# Patient Record
Sex: MALE | Race: WHITE | Employment: OTHER | URBAN - METROPOLITAN AREA
[De-identification: names, ages, dates, MRNs, and addresses within clinical notes are randomized per-mention and may not be internally consistent; named-entity substitution may affect disease eponyms.]

---

## 2017-02-10 ENCOUNTER — OFFICE VISIT (OUTPATIENT)
Dept: OPTOMETRY | Facility: CLINIC | Age: 67
End: 2017-02-10

## 2017-02-10 DIAGNOSIS — H52.03 HYPEROPIA WITH ASTIGMATISM AND PRESBYOPIA, BILATERAL: ICD-10-CM

## 2017-02-10 DIAGNOSIS — H52.4 HYPEROPIA WITH ASTIGMATISM AND PRESBYOPIA, BILATERAL: ICD-10-CM

## 2017-02-10 DIAGNOSIS — H52.203 HYPEROPIA WITH ASTIGMATISM AND PRESBYOPIA, BILATERAL: ICD-10-CM

## 2017-02-10 DIAGNOSIS — E11.9 DIABETES MELLITUS, STABLE (HCC): Primary | ICD-10-CM

## 2017-02-10 DIAGNOSIS — H25.13 AGE-RELATED NUCLEAR CATARACT OF BOTH EYES: ICD-10-CM

## 2017-02-10 PROCEDURE — 92014 COMPRE OPH EXAM EST PT 1/>: CPT | Performed by: OPTOMETRIST

## 2017-02-10 NOTE — PATIENT INSTRUCTIONS
Hyperopia with astigmatism and presbyopia  Patient is happy using his +3.25 OTC readers.     Diabetes mellitus, stable ([facility] [address]  I advised patient that there is no background diabetic retinopathy in either eye and that they should continue to keep their blood

## 2017-02-10 NOTE — PROGRESS NOTES
Leticia Vera is a 73year old male. HPI:     HPI     Diabetic Eye Exam   Diabetes characteristics include Type 2, controlled with diet and taking oral medications. Duration of 14 years.            Comments   Patient is in for an annual diabetic eye ex MG Oral Tab Take 5 mg by mouth 3 (three) times daily as needed for Muscle spasms.  Disp:  Rfl:    Glucose Blood (ONETOUCH ULTRA BLUE) In Vitro Strip Test by Intradermal route  every day Disp:  Rfl:    NAPROXEN  MG Oral Tab EC  Disp:  Rfl: 0       Jerry reading only      Manifest Refraction     Declines --happy with just OTC reading glasses and does not want distance glasses.                  ASSESSMENT/PLAN:     Diagnoses and Plan:     Hyperopia with astigmatism and presbyopia  Patient is happy using his

## 2017-02-10 NOTE — PROGRESS NOTES
Kelli Francis is a 73year old male. HPI:     HPI     Diabetic Eye Exam   Diabetes characteristics include Type 2, controlled with diet and taking oral medications. Duration of 15 years.            Comments   Patient is in for an annual diabetic eye ex MG Oral Tab Take 5 mg by mouth 3 (three) times daily as needed for Muscle spasms.  Disp:  Rfl:    Glucose Blood (ONETOUCH ULTRA BLUE) In Vitro Strip Test by Intradermal route  every day Disp:  Rfl:    NAPROXEN  MG Oral Tab EC  Disp:  Rfl: 0       Jerry reading only      Manifest Refraction     Declines --happy with just OTC reading glasses and does not want distance glasses.                  ASSESSMENT/PLAN:     Diagnoses and Plan:     Hyperopia with astigmatism and presbyopia  Patient is happy using his

## 2017-04-17 ENCOUNTER — APPOINTMENT (OUTPATIENT)
Dept: CT IMAGING | Facility: HOSPITAL | Age: 67
End: 2017-04-17
Attending: EMERGENCY MEDICINE
Payer: COMMERCIAL

## 2017-04-17 ENCOUNTER — HOSPITAL ENCOUNTER (EMERGENCY)
Facility: HOSPITAL | Age: 67
Discharge: HOME | End: 2017-04-17
Payer: COMMERCIAL

## 2017-04-17 ENCOUNTER — APPOINTMENT (OUTPATIENT)
Dept: ULTRASOUND IMAGING | Facility: HOSPITAL | Age: 67
End: 2017-04-17
Payer: COMMERCIAL

## 2017-04-17 VITALS
TEMPERATURE: 98 F | HEART RATE: 79 BPM | SYSTOLIC BLOOD PRESSURE: 130 MMHG | OXYGEN SATURATION: 98 % | HEIGHT: 70 IN | BODY MASS INDEX: 26.39 KG/M2 | WEIGHT: 184.31 LBS | RESPIRATION RATE: 18 BRPM | DIASTOLIC BLOOD PRESSURE: 79 MMHG

## 2017-04-17 DIAGNOSIS — I82.412 ACUTE DEEP VEIN THROMBOSIS (DVT) OF FEMORAL VEIN OF LEFT LOWER EXTREMITY (HCC): Primary | ICD-10-CM

## 2017-04-17 PROCEDURE — 99285 EMERGENCY DEPT VISIT HI MDM: CPT

## 2017-04-17 PROCEDURE — 71260 CT THORAX DX C+: CPT

## 2017-04-17 PROCEDURE — 85610 PROTHROMBIN TIME: CPT | Performed by: EMERGENCY MEDICINE

## 2017-04-17 PROCEDURE — 85025 COMPLETE CBC W/AUTO DIFF WBC: CPT | Performed by: EMERGENCY MEDICINE

## 2017-04-17 PROCEDURE — 80076 HEPATIC FUNCTION PANEL: CPT | Performed by: EMERGENCY MEDICINE

## 2017-04-17 PROCEDURE — 93971 EXTREMITY STUDY: CPT

## 2017-04-17 PROCEDURE — 96361 HYDRATE IV INFUSION ADD-ON: CPT

## 2017-04-17 PROCEDURE — 80048 BASIC METABOLIC PNL TOTAL CA: CPT | Performed by: EMERGENCY MEDICINE

## 2017-04-17 PROCEDURE — 85730 THROMBOPLASTIN TIME PARTIAL: CPT | Performed by: EMERGENCY MEDICINE

## 2017-04-17 PROCEDURE — 96360 HYDRATION IV INFUSION INIT: CPT

## 2017-04-17 RX ORDER — SODIUM CHLORIDE 9 MG/ML
INJECTION, SOLUTION INTRAVENOUS ONCE
Status: COMPLETED | OUTPATIENT
Start: 2017-04-17 | End: 2017-04-17

## 2017-04-18 ENCOUNTER — TELEPHONE (OUTPATIENT)
Dept: NEPHROLOGY | Facility: CLINIC | Age: 67
End: 2017-04-18

## 2017-04-18 NOTE — TELEPHONE ENCOUNTER
Nisha Yost states pt went to ER yesterday and requesting pt to be seen in the next few days by MKK. Nisha Yost is aware MKK will be out of the office - requesting medical advise. PLs call [phone] or call cell at [phone]. Thank you.

## 2017-04-18 NOTE — ED PROVIDER NOTES
Patient Seen in: [facility] AND [facility] Emergency Department    History   Patient presents with:  Deep Vein Thrombosis (cardiovascular)    Stated Complaint: possible DVT left leg    HPI    Patient is a 43-year-old male with a history of diabetes who complains Smoker                      Alcohol Use: Yes                Comment: beer, 1 glass, socially      Review of Systems    Positive for stated complaint: possible DVT left leg  Other systems are as noted in HPI. Constitutional and vital signs reviewed.       A affect. Behavior is normal. Judgment and thought content normal.   Nursing note and vitals reviewed.           ED Course     Labs Reviewed   BASIC METABOLIC PANEL (8) - Abnormal; Notable for the following:     Glucose 263 (*)     All other components within diagnosis)    Disposition:  Discharge    Follow-up:  MD Clarice Wilson [id number]  [phone]    In 2 days        Medications Prescribed:  Current Discharge Medication List    START taking these medications    rivaroxaban

## 2017-04-18 NOTE — TELEPHONE ENCOUNTER
Left message on Guadalupe's cell that Aldo Wilson needs to see internist as a vascular surgeon would not deal with DVT at this time per MKK. Advised to call office to let us know she received message and if she has any questions.

## 2017-04-18 NOTE — TELEPHONE ENCOUNTER
He has a deep venous thrombosis. Again needs to see an internist.  A vascular surgeon will not deal with this.

## 2017-04-18 NOTE — TELEPHONE ENCOUNTER
Spouse Nisha Yost calling to f/u on message.  Stated preferred to take pt to vascular specialist instead of another internist, Spouse states she is trying to get advise from Rn.

## 2017-04-24 ENCOUNTER — OFFICE VISIT (OUTPATIENT)
Dept: INTERNAL MEDICINE CLINIC | Facility: CLINIC | Age: 67
End: 2017-04-24

## 2017-04-24 VITALS
SYSTOLIC BLOOD PRESSURE: 120 MMHG | OXYGEN SATURATION: 96 % | RESPIRATION RATE: 16 BRPM | DIASTOLIC BLOOD PRESSURE: 80 MMHG | WEIGHT: 178 LBS | TEMPERATURE: 99 F | HEART RATE: 93 BPM | BODY MASS INDEX: 26.36 KG/M2 | HEIGHT: 69 IN

## 2017-04-24 DIAGNOSIS — I82.402 LEG DVT (DEEP VENOUS THROMBOEMBOLISM), ACUTE, LEFT (HCC): Primary | ICD-10-CM

## 2017-04-24 DIAGNOSIS — E11.65 POORLY CONTROLLED TYPE 2 DIABETES MELLITUS (HCC): ICD-10-CM

## 2017-04-24 DIAGNOSIS — I51.7 CARDIOMEGALY: ICD-10-CM

## 2017-04-24 DIAGNOSIS — R59.1 LYMPHADENOPATHY: ICD-10-CM

## 2017-04-24 PROCEDURE — 99203 OFFICE O/P NEW LOW 30 MIN: CPT | Performed by: INTERNAL MEDICINE

## 2017-04-24 NOTE — PROGRESS NOTES
Kelli Francis is a 73year old male.   Patient presents with:  ER F/U: New patient presents to clinic for ER follow up. pt was Dx with DVT        HPI:         New patient follows up after seen in ER for extensive DVT of the left leg extending from femora HAND/FINGER SURGERY UNLISTED Right     Comment hand surgery      Family History   Problem Relation Age of Onset   • Diabetes Other      aunt   • Diabetes Sister    • Diabetes Other      uncle   • Glaucoma Neg    • Diabetes Father    • Cancer Mother      co 100  mmol/L   CO2 25 22-32 mmol/L   BUN 9 8-20 mg/dL   Creatinine 0.64 0.50-1.50 mg/dL   Calcium, Total 8.9 8.5-10.5 mg/dL   BUN/CREA Ratio 14.1 10.0-20.0   Anion Gap 11 0-18   Calculated Osmolality 290 275-295 mOsm/kg   GFR, Non-African American >60 visualized greater saphenous vein at the junction appear normal. Extensive thrombus identified at the proximal to distal femoral vein, the popliteal veins and extending  into and involving the paired posterior tibial and peroneal veins with additional invo subsegmental atelectasis in the posterior aspects of the lungs. No focal pulmonary opacity or nodule. No pleural effusion or pneumothorax. VASCULATURE: Main pulmonary artery is not enlarged.  No acute pulmonary embolus to the segmental pulmonary artery le file for this visit. No Follow-up on file.         Ahsan Velarde MD

## 2017-05-04 ENCOUNTER — OFFICE VISIT (OUTPATIENT)
Dept: INTERNAL MEDICINE CLINIC | Facility: CLINIC | Age: 67
End: 2017-05-04

## 2017-05-04 VITALS
HEART RATE: 80 BPM | OXYGEN SATURATION: 97 % | DIASTOLIC BLOOD PRESSURE: 78 MMHG | TEMPERATURE: 99 F | HEIGHT: 69 IN | RESPIRATION RATE: 14 BRPM | BODY MASS INDEX: 27.55 KG/M2 | WEIGHT: 186 LBS | SYSTOLIC BLOOD PRESSURE: 130 MMHG

## 2017-05-04 DIAGNOSIS — E08.65: ICD-10-CM

## 2017-05-04 DIAGNOSIS — E08.59: ICD-10-CM

## 2017-05-04 DIAGNOSIS — I82.402 ACUTE DEEP VEIN THROMBOSIS (DVT) OF LEFT LOWER EXTREMITY, UNSPECIFIED VEIN (HCC): Primary | ICD-10-CM

## 2017-05-04 PROCEDURE — 99213 OFFICE O/P EST LOW 20 MIN: CPT | Performed by: INTERNAL MEDICINE

## 2017-05-04 NOTE — PROGRESS NOTES
Ileana Yoon is a 73year old male. Patient presents with: Follow - Up: pt presents to clinic for follow uo on Left leg DVT       HPI:       No  bleeding problems  Pain left leg if stands on it too long, tho feels better than prior.   Feels well other hernia      Navel Hernia   • Diabetes ([facility]    • Pneumonia 12-25-14   • Diabetes mellitus ([facility]    • Herniated intervertebral disc of lumbar spine           Past Surgical History    UMBILICAL HERNIA REPAIR      HAND/FINGER SURGERY UNLISTED Right     Commen METABOLIC PANEL (8)   Result Value Ref Range   Glucose 263 (H) 70-99 mg/dL   Sodium 136 136-144 mmol/L   Potassium 4.1 3.3-5.1 mmol/L   Chloride 100  mmol/L   CO2 25 22-32 mmol/L   BUN 9 8-20 mg/dL   Creatinine 0.64 0.50-1.50 mg/dL   Calcium, Total 8 left lower extremity was performed in the usual manner. FINDINGS: Venous flow was evaluated with color and pulsed Doppler.  Common femoral and visualized greater saphenous vein at the junction appear normal. Extensive thrombus identified at the proximal to gastroesophageal junction node. No mediastinal or hilar adenopathy. LUNGS/PLEURA: Central airways are patent. There is dependent subsegmental atelectasis in the posterior aspects of the lungs. No focal pulmonary opacity or nodule.   No pleural effusion next visit. Presently, patient advised to continue diabetic medications and check blood sugar twice daily.           Imaging & Consults:  None    Meds & Refills for this Visit:     No prescriptions requested or ordered in this encounter    There are no [place]

## 2017-05-16 RX ORDER — METFORMIN HYDROCHLORIDE 500 MG/1
1000 TABLET, EXTENDED RELEASE ORAL 2 TIMES DAILY WITH MEALS
Qty: 120 TABLET | Refills: 0 | Status: SHIPPED | OUTPATIENT
Start: 2017-05-16 | End: 2017-06-12

## 2017-05-16 RX ORDER — GLIMEPIRIDE 2 MG/1
4 TABLET ORAL 2 TIMES DAILY
Qty: 120 TABLET | Refills: 0 | Status: SHIPPED | OUTPATIENT
Start: 2017-05-16 | End: 2017-07-10

## 2017-06-05 ENCOUNTER — OFFICE VISIT (OUTPATIENT)
Dept: INTERNAL MEDICINE CLINIC | Facility: CLINIC | Age: 67
End: 2017-06-05

## 2017-06-05 VITALS
TEMPERATURE: 98 F | BODY MASS INDEX: 27.99 KG/M2 | WEIGHT: 189 LBS | SYSTOLIC BLOOD PRESSURE: 110 MMHG | RESPIRATION RATE: 17 BRPM | HEIGHT: 69 IN | HEART RATE: 63 BPM | OXYGEN SATURATION: 97 % | DIASTOLIC BLOOD PRESSURE: 60 MMHG

## 2017-06-05 DIAGNOSIS — I82.402 DEEP VEIN THROMBOSIS (DVT) OF LEFT LOWER EXTREMITY, UNSPECIFIED CHRONICITY, UNSPECIFIED VEIN (HCC): Primary | ICD-10-CM

## 2017-06-05 DIAGNOSIS — E88.9 PERIPHERAL NEUROPATHY DUE TO METABOLIC DISORDER (HCC): ICD-10-CM

## 2017-06-05 DIAGNOSIS — G63 PERIPHERAL NEUROPATHY DUE TO METABOLIC DISORDER (HCC): ICD-10-CM

## 2017-06-05 DIAGNOSIS — E11.8 TYPE 2 DIABETES MELLITUS WITH COMPLICATION, WITHOUT LONG-TERM CURRENT USE OF INSULIN (HCC): ICD-10-CM

## 2017-06-05 DIAGNOSIS — E78.1 HYPERTRIGLYCERIDEMIA: ICD-10-CM

## 2017-06-05 PROCEDURE — 99214 OFFICE O/P EST MOD 30 MIN: CPT | Performed by: INTERNAL MEDICINE

## 2017-06-05 NOTE — PROGRESS NOTES
Shelia Aponte is a 73year old male.   Patient presents with:  Diabetes: DM check up// needs everything DM and is due for an AWV  Deep Vein Thrombosis (cardiovascular): f/u on that as well       HPI:         Blood sugar too low at times, once in mid afte colon cancer,  80        Smoking Status: Never Smoker                      Alcohol Use: Yes                Comment: beer, 1 glass, socially         Review of System:  CONSTITUTION: denies fevers,  chills, or sweats  HEENT: denies sore throat,  change i -American >60 >=60   -HEPATIC FUNCTION PANEL (7)   Result Value Ref Range   AST 30 15-41 U/L   ALT 33 17-63 U/L   Alkaline Phosphatase 50  U/L   Bilirubin, Total 1.0 0.3-1.2 mg/dL   Total Protein 7.1 5.9-8.4 g/dL   Albumin 3.4 (L) 3.5-4.8 g/dL Decrease glimeripide to 1 at evening    (E88.9,  G99.0) 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

## 2017-06-12 RX ORDER — METFORMIN HYDROCHLORIDE 500 MG/1
TABLET, EXTENDED RELEASE ORAL
Qty: 120 TABLET | Refills: 1 | Status: SHIPPED | OUTPATIENT
Start: 2017-06-12 | End: 2017-08-23

## 2017-07-02 ENCOUNTER — LAB ENCOUNTER (OUTPATIENT)
Dept: LAB | Facility: HOSPITAL | Age: 67
End: 2017-07-02
Attending: INTERNAL MEDICINE
Payer: COMMERCIAL

## 2017-07-02 DIAGNOSIS — E11.65 UNCONTROLLED TYPE 2 DIABETES MELLITUS WITH COMPLICATION, UNSPECIFIED LONG TERM INSULIN USE STATUS: ICD-10-CM

## 2017-07-02 DIAGNOSIS — E78.1 HYPERTRIGLYCERIDEMIA: ICD-10-CM

## 2017-07-02 DIAGNOSIS — E11.8 UNCONTROLLED TYPE 2 DIABETES MELLITUS WITH COMPLICATION, UNSPECIFIED LONG TERM INSULIN USE STATUS: ICD-10-CM

## 2017-07-02 DIAGNOSIS — E11.8 TYPE 2 DIABETES MELLITUS WITH COMPLICATION, WITHOUT LONG-TERM CURRENT USE OF INSULIN (HCC): ICD-10-CM

## 2017-07-02 DIAGNOSIS — I82.402 DEEP VEIN THROMBOSIS (DVT) OF LEFT LOWER EXTREMITY, UNSPECIFIED CHRONICITY, UNSPECIFIED VEIN (HCC): ICD-10-CM

## 2017-07-02 LAB
ALBUMIN SERPL BCP-MCNC: 3.7 G/DL (ref 3.5–4.8)
ALBUMIN/GLOB SERPL: 1.1 {RATIO} (ref 1–2)
ALP SERPL-CCNC: 40 U/L (ref 32–100)
ALT SERPL-CCNC: 42 U/L (ref 17–63)
ANION GAP SERPL CALC-SCNC: 10 MMOL/L (ref 0–18)
AST SERPL-CCNC: 40 U/L (ref 15–41)
BASOPHILS # BLD: 0 K/UL (ref 0–0.2)
BASOPHILS NFR BLD: 0 %
BILIRUB SERPL-MCNC: 1.1 MG/DL (ref 0.3–1.2)
BUN SERPL-MCNC: 12 MG/DL (ref 8–20)
BUN/CREAT SERPL: 15.6 (ref 10–20)
CALCIUM SERPL-MCNC: 8.9 MG/DL (ref 8.5–10.5)
CHLORIDE SERPL-SCNC: 102 MMOL/L (ref 95–110)
CHOLEST SERPL-MCNC: 110 MG/DL (ref 110–200)
CO2 SERPL-SCNC: 26 MMOL/L (ref 22–32)
CREAT SERPL-MCNC: 0.77 MG/DL (ref 0.5–1.5)
CREAT UR-MCNC: 178.3 MG/DL
EOSINOPHIL # BLD: 0.1 K/UL (ref 0–0.7)
EOSINOPHIL NFR BLD: 2 %
ERYTHROCYTE [DISTWIDTH] IN BLOOD BY AUTOMATED COUNT: 13.4 % (ref 11–15)
GLOBULIN PLAS-MCNC: 3.5 G/DL (ref 2.5–3.7)
GLUCOSE SERPL-MCNC: 97 MG/DL (ref 70–99)
HBA1C MFR BLD: 7.5 % (ref 4–6)
HCT VFR BLD AUTO: 43.5 % (ref 41–52)
HDLC SERPL-MCNC: 40 MG/DL
HGB BLD-MCNC: 14.7 G/DL (ref 13.5–17.5)
LDLC SERPL CALC-MCNC: 53 MG/DL (ref 0–99)
LYMPHOCYTES # BLD: 1.9 K/UL (ref 1–4)
LYMPHOCYTES NFR BLD: 27 %
MCH RBC QN AUTO: 30.4 PG (ref 27–32)
MCHC RBC AUTO-ENTMCNC: 33.8 G/DL (ref 32–37)
MCV RBC AUTO: 89.8 FL (ref 80–100)
MICROALBUMIN UR-MCNC: 0.6 MG/DL (ref 0–1.8)
MICROALBUMIN/CREAT UR: 3.4 MG/G{CREAT} (ref 0–20)
MONOCYTES # BLD: 0.5 K/UL (ref 0–1)
MONOCYTES NFR BLD: 7 %
NEUTROPHILS # BLD AUTO: 4.5 K/UL (ref 1.8–7.7)
NEUTROPHILS NFR BLD: 64 %
NONHDLC SERPL-MCNC: 70 MG/DL
OSMOLALITY UR CALC.SUM OF ELEC: 286 MOSM/KG (ref 275–295)
PLATELET # BLD AUTO: 163 K/UL (ref 140–400)
PMV BLD AUTO: 8.4 FL (ref 7.4–10.3)
POTASSIUM SERPL-SCNC: 4.3 MMOL/L (ref 3.3–5.1)
PROT SERPL-MCNC: 7.2 G/DL (ref 5.9–8.4)
RBC # BLD AUTO: 4.84 M/UL (ref 4.5–5.9)
SODIUM SERPL-SCNC: 138 MMOL/L (ref 136–144)
TRIGL SERPL-MCNC: 85 MG/DL (ref 1–149)
TSH SERPL-ACNC: 1.69 UIU/ML (ref 0.45–5.33)
WBC # BLD AUTO: 7 K/UL (ref 4–11)

## 2017-07-02 PROCEDURE — 36415 COLL VENOUS BLD VENIPUNCTURE: CPT

## 2017-07-02 PROCEDURE — 80053 COMPREHEN METABOLIC PANEL: CPT

## 2017-07-02 PROCEDURE — 84443 ASSAY THYROID STIM HORMONE: CPT

## 2017-07-02 PROCEDURE — 82043 UR ALBUMIN QUANTITATIVE: CPT

## 2017-07-02 PROCEDURE — 82570 ASSAY OF URINE CREATININE: CPT

## 2017-07-02 PROCEDURE — 85025 COMPLETE CBC W/AUTO DIFF WBC: CPT

## 2017-07-02 PROCEDURE — 83036 HEMOGLOBIN GLYCOSYLATED A1C: CPT

## 2017-07-02 PROCEDURE — 80061 LIPID PANEL: CPT

## 2017-07-03 ENCOUNTER — TELEPHONE (OUTPATIENT)
Dept: NEPHROLOGY | Facility: CLINIC | Age: 67
End: 2017-07-03

## 2017-07-03 DIAGNOSIS — I82.402 ACUTE DEEP VEIN THROMBOSIS (DVT) OF LEFT LOWER EXTREMITY, UNSPECIFIED VEIN (HCC): ICD-10-CM

## 2017-07-05 ENCOUNTER — HOSPITAL ENCOUNTER (OUTPATIENT)
Dept: ULTRASOUND IMAGING | Facility: HOSPITAL | Age: 67
Discharge: HOME | End: 2017-07-05
Attending: INTERNAL MEDICINE
Payer: COMMERCIAL

## 2017-07-05 DIAGNOSIS — I82.402 DEEP VEIN THROMBOSIS (DVT) OF LEFT LOWER EXTREMITY, UNSPECIFIED CHRONICITY, UNSPECIFIED VEIN (HCC): ICD-10-CM

## 2017-07-05 PROCEDURE — 93971 EXTREMITY STUDY: CPT | Performed by: INTERNAL MEDICINE

## 2017-07-05 NOTE — TELEPHONE ENCOUNTER
Spoke to Jacqueline Zaman, patient's wife. Results message from Dr. Bridget Bowers read. Wife is aware to call back to schedule a routing 6 month follow up visit after she talks to patient Ambika Craig.

## 2017-07-08 RX ORDER — GLIMEPIRIDE 2 MG/1
TABLET ORAL
Qty: 120 TABLET | Refills: 0 | Status: CANCELLED | OUTPATIENT
Start: 2017-07-08

## 2017-07-09 RX ORDER — RIVAROXABAN 20 MG/1
TABLET, FILM COATED ORAL
Qty: 30 TABLET | Refills: 2 | Status: SHIPPED | OUTPATIENT
Start: 2017-07-09 | End: 2017-07-10

## 2017-07-10 ENCOUNTER — OFFICE VISIT (OUTPATIENT)
Dept: INTERNAL MEDICINE CLINIC | Facility: CLINIC | Age: 67
End: 2017-07-10

## 2017-07-10 VITALS
DIASTOLIC BLOOD PRESSURE: 70 MMHG | SYSTOLIC BLOOD PRESSURE: 114 MMHG | BODY MASS INDEX: 28.14 KG/M2 | HEART RATE: 77 BPM | WEIGHT: 190 LBS | TEMPERATURE: 99 F | HEIGHT: 69 IN | OXYGEN SATURATION: 100 %

## 2017-07-10 DIAGNOSIS — R25.2 CRAMPS, EXTREMITY: ICD-10-CM

## 2017-07-10 DIAGNOSIS — E11.9 TYPE 2 DIABETES MELLITUS WITHOUT COMPLICATION, WITHOUT LONG-TERM CURRENT USE OF INSULIN (HCC): ICD-10-CM

## 2017-07-10 DIAGNOSIS — I82.402 ACUTE DEEP VEIN THROMBOSIS (DVT) OF LEFT LOWER EXTREMITY, UNSPECIFIED VEIN (HCC): ICD-10-CM

## 2017-07-10 DIAGNOSIS — I82.432 ACUTE DEEP VEIN THROMBOSIS (DVT) OF POPLITEAL VEIN OF LEFT LOWER EXTREMITY (HCC): ICD-10-CM

## 2017-07-10 DIAGNOSIS — I82.412 ACUTE DEEP VEIN THROMBOSIS (DVT) OF FEMORAL VEIN OF LEFT LOWER EXTREMITY (HCC): Primary | ICD-10-CM

## 2017-07-10 PROCEDURE — 82550 ASSAY OF CK (CPK): CPT | Performed by: INTERNAL MEDICINE

## 2017-07-10 PROCEDURE — 80048 BASIC METABOLIC PNL TOTAL CA: CPT | Performed by: INTERNAL MEDICINE

## 2017-07-10 PROCEDURE — 83735 ASSAY OF MAGNESIUM: CPT | Performed by: INTERNAL MEDICINE

## 2017-07-10 PROCEDURE — 99213 OFFICE O/P EST LOW 20 MIN: CPT | Performed by: INTERNAL MEDICINE

## 2017-07-10 RX ORDER — GLIMEPIRIDE 2 MG/1
TABLET ORAL
Qty: 120 TABLET | Refills: 0 | Status: SHIPPED | OUTPATIENT
Start: 2017-07-10 | End: 2017-09-11

## 2017-07-10 RX ORDER — MAGNESIUM SULFATE HEPTAHYDRATE 500 MG/ML
1 INJECTION, SOLUTION INTRAMUSCULAR; INTRAVENOUS ONCE
Status: DISCONTINUED | OUTPATIENT
Start: 2017-07-10 | End: 2018-01-10 | Stop reason: ALTCHOICE

## 2017-07-11 LAB
CK SERPL-CCNC: 159 U/L (ref 49–397)
MAGNESIUM SERPL-MCNC: 1.8 MG/DL (ref 1.8–2.5)

## 2017-07-12 LAB
ANION GAP SERPL CALC-SCNC: 13 MMOL/L (ref 0–18)
BUN SERPL-MCNC: 14 MG/DL (ref 8–20)
BUN/CREAT SERPL: 20.6 (ref 10–20)
CALCIUM SERPL-MCNC: 9.8 MG/DL (ref 8.5–10.5)
CHLORIDE SERPL-SCNC: 102 MMOL/L (ref 95–110)
CO2 SERPL-SCNC: 24 MMOL/L (ref 22–32)
CREAT SERPL-MCNC: 0.68 MG/DL (ref 0.5–1.5)
GLUCOSE SERPL-MCNC: 58 MG/DL (ref 70–99)
OSMOLALITY UR CALC.SUM OF ELEC: 286 MOSM/KG (ref 275–295)
POTASSIUM SERPL-SCNC: 4.3 MMOL/L (ref 3.3–5.1)
SODIUM SERPL-SCNC: 139 MMOL/L (ref 136–144)

## 2017-07-14 ENCOUNTER — TELEPHONE (OUTPATIENT)
Dept: INTERNAL MEDICINE CLINIC | Facility: CLINIC | Age: 67
End: 2017-07-14

## 2017-07-14 NOTE — TELEPHONE ENCOUNTER
Advised blood sugar lo- to check BS in afternoon, decrease meds as discussed in offive visit.   Call office if any questions

## 2017-08-23 RX ORDER — METFORMIN HYDROCHLORIDE 500 MG/1
TABLET, EXTENDED RELEASE ORAL
Qty: 120 TABLET | Refills: 0 | Status: SHIPPED | OUTPATIENT
Start: 2017-08-23 | End: 2017-09-30

## 2017-08-23 RX ORDER — METFORMIN HYDROCHLORIDE 500 MG/1
TABLET, EXTENDED RELEASE ORAL
Qty: 120 TABLET | Refills: 3 | Status: CANCELLED | OUTPATIENT
Start: 2017-08-23

## 2017-09-13 ENCOUNTER — TELEPHONE (OUTPATIENT)
Dept: INTERNAL MEDICINE CLINIC | Facility: CLINIC | Age: 67
End: 2017-09-13

## 2017-09-13 RX ORDER — GLIMEPIRIDE 2 MG/1
TABLET ORAL
Qty: 30 TABLET | Refills: 3 | Status: SHIPPED | OUTPATIENT
Start: 2017-09-13 | End: 2017-09-13 | Stop reason: DRUGHIGH

## 2017-09-13 RX ORDER — GLIMEPIRIDE 2 MG/1
TABLET ORAL
Qty: 90 TABLET | Refills: 3 | OUTPATIENT
Start: 2017-09-13 | End: 2018-03-02

## 2017-09-14 ENCOUNTER — OFFICE VISIT (OUTPATIENT)
Dept: INTERNAL MEDICINE CLINIC | Facility: CLINIC | Age: 67
End: 2017-09-14

## 2017-09-14 VITALS
HEART RATE: 70 BPM | SYSTOLIC BLOOD PRESSURE: 130 MMHG | OXYGEN SATURATION: 97 % | TEMPERATURE: 99 F | WEIGHT: 192 LBS | BODY MASS INDEX: 28 KG/M2 | DIASTOLIC BLOOD PRESSURE: 70 MMHG

## 2017-09-14 DIAGNOSIS — I82.502 LEG DVT (DEEP VENOUS THROMBOEMBOLISM), CHRONIC, LEFT (HCC): Primary | ICD-10-CM

## 2017-09-14 DIAGNOSIS — J30.9 ALLERGIC SINUSITIS: ICD-10-CM

## 2017-09-14 DIAGNOSIS — E11.9 TYPE 2 DIABETES MELLITUS WITHOUT COMPLICATION, WITHOUT LONG-TERM CURRENT USE OF INSULIN (HCC): ICD-10-CM

## 2017-09-14 LAB
BASOPHILS # BLD: 0 K/UL (ref 0–0.2)
BASOPHILS NFR BLD: 1 %
EOSINOPHIL # BLD: 0.1 K/UL (ref 0–0.7)
EOSINOPHIL NFR BLD: 2 %
ERYTHROCYTE [DISTWIDTH] IN BLOOD BY AUTOMATED COUNT: 13.4 % (ref 11–15)
HCT VFR BLD AUTO: 45.5 % (ref 41–52)
HGB BLD-MCNC: 15 G/DL (ref 13.5–17.5)
LYMPHOCYTES # BLD: 1.8 K/UL (ref 1–4)
LYMPHOCYTES NFR BLD: 25 %
MCH RBC QN AUTO: 30.1 PG (ref 27–32)
MCHC RBC AUTO-ENTMCNC: 33 G/DL (ref 32–37)
MCV RBC AUTO: 91.3 FL (ref 80–100)
MONOCYTES # BLD: 0.6 K/UL (ref 0–1)
MONOCYTES NFR BLD: 8 %
NEUTROPHILS # BLD AUTO: 4.5 K/UL (ref 1.8–7.7)
NEUTROPHILS NFR BLD: 64 %
PLATELET # BLD AUTO: 147 K/UL (ref 140–400)
PMV BLD AUTO: 9 FL (ref 7.4–10.3)
RBC # BLD AUTO: 4.98 M/UL (ref 4.5–5.9)
WBC # BLD AUTO: 7 K/UL (ref 4–11)

## 2017-09-14 PROCEDURE — 85025 COMPLETE CBC W/AUTO DIFF WBC: CPT | Performed by: INTERNAL MEDICINE

## 2017-09-14 PROCEDURE — 99213 OFFICE O/P EST LOW 20 MIN: CPT | Performed by: INTERNAL MEDICINE

## 2017-09-14 PROCEDURE — 36415 COLL VENOUS BLD VENIPUNCTURE: CPT | Performed by: INTERNAL MEDICINE

## 2017-09-14 NOTE — PROGRESS NOTES
Pt presented to clinic today for blood draw. Per physician able to draw orders. Orders  documented within chart. Pt tolerated lab draw well.  verified.   Orders drawn include: cbc  Site of draw: rt dania Medina CMA

## 2017-09-14 NOTE — PROGRESS NOTES
Gaudencio Snyder is a 78year old male. Patient presents with: Follow - Up: pt presents to clinic for follow up on DVT and Xarelto medication. needs order for cln       HPI:       Seasonal sinus issues, sinus drainage with cough. No fever or chills.   Renate Yoon Diabetes Sister    • Diabetes Other      uncle   • Glaucoma Neg       Smoking status: Never Smoker                                                              Smokeless tobacco: Never Used                      Alcohol use:  Yes              Comment: sy, mg/dL   Calcium, Total 9.8 8.5 - 10.5 mg/dL   BUN/CREA Ratio 20.6 (H) 10.0 - 20.0   Anion Gap 13 0 - 18 mmol/L   Calculated Osmolality 286 275 - 295 mOsm/kg   GFR, Non-African American >60 >=60   GFR, -American >60 >=60   -MAGNESIUM   Result Value R

## 2017-09-25 ENCOUNTER — TELEPHONE (OUTPATIENT)
Dept: INTERNAL MEDICINE CLINIC | Facility: CLINIC | Age: 67
End: 2017-09-25

## 2017-09-25 NOTE — TELEPHONE ENCOUNTER
Called pt not available left msg on cell vm informing labs normal. Pt call back if questions or concerns

## 2017-09-25 NOTE — TELEPHONE ENCOUNTER
----- Message from Max Mendez MD sent at 9/24/2017 10:10 PM CDT -----  These tell patient that his blood count was very good, was normal.

## 2017-10-02 ENCOUNTER — HOSPITAL ENCOUNTER (OUTPATIENT)
Dept: ULTRASOUND IMAGING | Facility: HOSPITAL | Age: 67
Discharge: HOME | End: 2017-10-02
Attending: INTERNAL MEDICINE
Payer: COMMERCIAL

## 2017-10-02 DIAGNOSIS — I82.502 LEG DVT (DEEP VENOUS THROMBOEMBOLISM), CHRONIC, LEFT (HCC): ICD-10-CM

## 2017-10-02 PROCEDURE — 93971 EXTREMITY STUDY: CPT | Performed by: INTERNAL MEDICINE

## 2017-10-06 RX ORDER — METFORMIN HYDROCHLORIDE 500 MG/1
TABLET, EXTENDED RELEASE ORAL
Qty: 120 TABLET | Refills: 0 | Status: SHIPPED | OUTPATIENT
Start: 2017-10-06 | End: 2017-10-12 | Stop reason: ALTCHOICE

## 2017-10-12 ENCOUNTER — OFFICE VISIT (OUTPATIENT)
Dept: INTERNAL MEDICINE CLINIC | Facility: CLINIC | Age: 67
End: 2017-10-12

## 2017-10-12 VITALS
WEIGHT: 190 LBS | HEIGHT: 69 IN | TEMPERATURE: 99 F | BODY MASS INDEX: 28.14 KG/M2 | OXYGEN SATURATION: 98 % | DIASTOLIC BLOOD PRESSURE: 80 MMHG | SYSTOLIC BLOOD PRESSURE: 124 MMHG | HEART RATE: 84 BPM

## 2017-10-12 DIAGNOSIS — Z12.11 COLON CANCER SCREENING: ICD-10-CM

## 2017-10-12 DIAGNOSIS — Z12.5 SCREENING FOR PROSTATE CANCER: ICD-10-CM

## 2017-10-12 DIAGNOSIS — I82.502 LEG DVT (DEEP VENOUS THROMBOEMBOLISM), CHRONIC, LEFT (HCC): Primary | ICD-10-CM

## 2017-10-12 DIAGNOSIS — R10.32 ABDOMINAL PAIN, LLQ (LEFT LOWER QUADRANT): ICD-10-CM

## 2017-10-12 DIAGNOSIS — R05.9 COUGH: ICD-10-CM

## 2017-10-12 DIAGNOSIS — E78.1 HYPERTRIGLYCERIDEMIA: ICD-10-CM

## 2017-10-12 DIAGNOSIS — J30.2 CHRONIC SEASONAL ALLERGIC RHINITIS, UNSPECIFIED TRIGGER: ICD-10-CM

## 2017-10-12 DIAGNOSIS — E11.59 CONTROLLED TYPE 2 DIABETES MELLITUS WITH OTHER CIRCULATORY COMPLICATION, WITHOUT LONG-TERM CURRENT USE OF INSULIN (HCC): ICD-10-CM

## 2017-10-12 DIAGNOSIS — R25.3 FASCICULATIONS OF MUSCLE: ICD-10-CM

## 2017-10-12 PROCEDURE — G0438 PPPS, INITIAL VISIT: HCPCS | Performed by: INTERNAL MEDICINE

## 2017-10-12 RX ORDER — METFORMIN HYDROCHLORIDE 500 MG/1
500 TABLET, EXTENDED RELEASE ORAL
COMMUNITY
End: 2017-12-24

## 2017-10-12 RX ORDER — METFORMIN HYDROCHLORIDE 500 MG/1
TABLET, FILM COATED, EXTENDED RELEASE ORAL
COMMUNITY
End: 2017-10-12 | Stop reason: ALTCHOICE

## 2017-10-12 NOTE — PROGRESS NOTES
HPI:   Camelia Barreto is a 78year old male who presents for a MA Supervisit.     Patient Active Problem List:     Diabetes mellitus, stable ([address]     Senile cataract     Hyperopia with astigmatism and presbyopia     Acute deep vein thrombosis (DVT) of le Functional Status     Hearing Problems?: No    Vision Problems? : No    Difficulty walking?: No    Difficulty dressing or bathing?: No    Problems with daily activities? : No    Memory Problems?: No      Fall/Risk Assessment     Do you have 3 or more m TAKE ONE TABLET BY MOUTH DAILY WITH FOOD Disp: 30 tablet Rfl: 2   Glucose Blood (ONETOUCH ULTRA BLUE) In Vitro Strip Test blood sugar daily Dx E11.9 Disp: 50 strip Rfl: 5   Glucose Blood (ONETOUCH ULTRA BLUE) In Vitro Strip Test by Intradermal route  every Position: Sitting, Cuff Size: adult)   Pulse 84   Temp 98.6 °F (37 °C) (Oral)   Ht 69\"   Wt 190 lb   SpO2 98%   BMI 28.06 kg/m²      > Wt Readings from Last 6 Encounters:  10/12/17 : 190 lb  09/14/17 : 192 lb  07/10/17 : 190 lb  06/05/17 : 189 lb  05/04/1 Xarelto.   Patient again warned regarding caution with increased tendency of bleeding with Xarelto.    (R10.32) Abdominal pain, LLQ (left lower quadrant)  Plan: OCCULT BLOOD, STOOL, CT ABDOMEN+PELVIS(CONTRAST        ONLY)(CPT=74177)        Focal persistent long-term current use of insulin (HCC)  -     URINALYSIS WITH CULTURE REFLEX; Future  -     URINALYSIS, NONAUTO, W/SCOPE    Colon cancer screening  -     OCCULT BLOOD, STOOL; Future    Screening for prostate cancer  -     PSA SCREEN; Future         The pat External Lab or Procedure   Annual Monitoring of Persistent     Medications (ACE/ARB, digoxin diuretics, anticonvulsants.)    Potassium  Annually Potassium (mmol/L)   Date Value   07/10/2017 4.3     POTASSIUM (P) (mmol/L)   Date Value   01/17/2015 4.4    N

## 2017-11-10 RX ORDER — METFORMIN HYDROCHLORIDE 500 MG/1
TABLET, EXTENDED RELEASE ORAL
Qty: 120 TABLET | Refills: 0 | Status: SHIPPED | OUTPATIENT
Start: 2017-11-10 | End: 2017-12-22

## 2017-12-13 ENCOUNTER — HOSPITAL ENCOUNTER (OUTPATIENT)
Dept: CT IMAGING | Facility: HOSPITAL | Age: 67
Discharge: HOME | End: 2017-12-13
Attending: INTERNAL MEDICINE
Payer: COMMERCIAL

## 2017-12-13 ENCOUNTER — HOSPITAL ENCOUNTER (OUTPATIENT)
Dept: GENERAL RADIOLOGY | Facility: HOSPITAL | Age: 67
Discharge: HOME | End: 2017-12-13
Attending: INTERNAL MEDICINE
Payer: COMMERCIAL

## 2017-12-13 DIAGNOSIS — R05.9 COUGH: ICD-10-CM

## 2017-12-13 DIAGNOSIS — R10.32 ABDOMINAL PAIN, LLQ (LEFT LOWER QUADRANT): ICD-10-CM

## 2017-12-13 PROCEDURE — 82565 ASSAY OF CREATININE: CPT

## 2017-12-13 PROCEDURE — 74177 CT ABD & PELVIS W/CONTRAST: CPT | Performed by: INTERNAL MEDICINE

## 2017-12-13 PROCEDURE — 71020 XR CHEST PA + LAT CHEST (CPT=71020): CPT | Performed by: INTERNAL MEDICINE

## 2017-12-18 ENCOUNTER — TELEPHONE (OUTPATIENT)
Dept: INTERNAL MEDICINE CLINIC | Facility: CLINIC | Age: 67
End: 2017-12-18

## 2017-12-18 RX ORDER — METFORMIN HYDROCHLORIDE 500 MG/1
TABLET, EXTENDED RELEASE ORAL
Qty: 120 TABLET | Refills: 0 | Status: CANCELLED | OUTPATIENT
Start: 2017-12-18

## 2017-12-18 NOTE — TELEPHONE ENCOUNTER
Patient's spouse thought he needed to have another US of the L leg for DVT but no order was entered since October. Please review and leave a message for the patient.

## 2017-12-22 DIAGNOSIS — R93.2 ABNORMAL LIVER CT: Primary | ICD-10-CM

## 2017-12-22 NOTE — TELEPHONE ENCOUNTER
Pt's spouse call to say that she has not heard back from the Dr regarding her  refill and orders. Please advise. Pt is out of his meds.

## 2017-12-24 ENCOUNTER — TELEPHONE (OUTPATIENT)
Dept: INTERNAL MEDICINE CLINIC | Facility: CLINIC | Age: 67
End: 2017-12-24

## 2017-12-24 DIAGNOSIS — I82.5Z2 CHRONIC DEEP VEIN THROMBOSIS (DVT) OF DISTAL VEIN OF LEFT LOWER EXTREMITY (HCC): Primary | ICD-10-CM

## 2017-12-24 RX ORDER — METFORMIN HYDROCHLORIDE 500 MG/1
1000 TABLET, EXTENDED RELEASE ORAL 2 TIMES DAILY WITH MEALS
Qty: 120 TABLET | Refills: 0 | Status: SHIPPED | OUTPATIENT
Start: 2017-12-24 | End: 2018-01-10 | Stop reason: ALTCHOICE

## 2017-12-24 RX ORDER — METFORMIN HYDROCHLORIDE 500 MG/1
TABLET, EXTENDED RELEASE ORAL
Qty: 120 TABLET | Refills: 0 | OUTPATIENT
Start: 2017-12-24

## 2017-12-24 RX ORDER — METFORMIN HYDROCHLORIDE 500 MG/1
TABLET, EXTENDED RELEASE ORAL
Qty: 120 TABLET | Refills: 0 | Status: SHIPPED | OUTPATIENT
Start: 2017-12-24 | End: 2018-03-27

## 2017-12-28 ENCOUNTER — HOSPITAL ENCOUNTER (OUTPATIENT)
Dept: ULTRASOUND IMAGING | Facility: HOSPITAL | Age: 67
Discharge: HOME | End: 2017-12-28
Attending: INTERNAL MEDICINE
Payer: COMMERCIAL

## 2017-12-28 DIAGNOSIS — I82.5Z2 CHRONIC DEEP VEIN THROMBOSIS (DVT) OF DISTAL VEIN OF LEFT LOWER EXTREMITY (HCC): ICD-10-CM

## 2017-12-28 PROCEDURE — 93971 EXTREMITY STUDY: CPT | Performed by: INTERNAL MEDICINE

## 2017-12-30 NOTE — TELEPHONE ENCOUNTER
2 check MRI abdomen and alpha-fetoprotein. Patient has appointment January 17, to get tests prior to this appointment.

## 2018-01-01 DIAGNOSIS — I82.402 ACUTE DEEP VEIN THROMBOSIS (DVT) OF LEFT LOWER EXTREMITY, UNSPECIFIED VEIN (HCC): ICD-10-CM

## 2018-01-02 DIAGNOSIS — R93.2 ABNORMAL CT OF LIVER: Primary | ICD-10-CM

## 2018-01-02 DIAGNOSIS — I82.402 ACUTE DEEP VEIN THROMBOSIS (DVT) OF LEFT LOWER EXTREMITY, UNSPECIFIED VEIN (HCC): ICD-10-CM

## 2018-01-02 DIAGNOSIS — R05.3 COUGH, PERSISTENT: ICD-10-CM

## 2018-01-02 DIAGNOSIS — R09.81 CONGESTION OF NASAL SINUS: ICD-10-CM

## 2018-01-02 DIAGNOSIS — R05.9 COUGH: ICD-10-CM

## 2018-01-02 NOTE — TELEPHONE ENCOUNTER
Call patient-discussed with wife. CT showed abnormalities of liver, MRI and alpha-fetoprotein requested. Wife states patient does not drink alcohol. To recheck in 1 week. Follow-up MRI, alpha-fetoprotein.

## 2018-01-03 RX ORDER — RIVAROXABAN 20 MG/1
TABLET, FILM COATED ORAL
Qty: 30 TABLET | Refills: 1 | OUTPATIENT
Start: 2018-01-03

## 2018-01-05 ENCOUNTER — LAB ENCOUNTER (OUTPATIENT)
Dept: LAB | Facility: HOSPITAL | Age: 68
End: 2018-01-05
Attending: INTERNAL MEDICINE
Payer: COMMERCIAL

## 2018-01-05 DIAGNOSIS — R93.2 ABNORMAL CT OF LIVER: ICD-10-CM

## 2018-01-05 DIAGNOSIS — Z12.5 SCREENING FOR PROSTATE CANCER: ICD-10-CM

## 2018-01-05 DIAGNOSIS — R05.3 COUGH, PERSISTENT: ICD-10-CM

## 2018-01-05 DIAGNOSIS — E11.59 CONTROLLED TYPE 2 DIABETES MELLITUS WITH OTHER CIRCULATORY COMPLICATION, WITHOUT LONG-TERM CURRENT USE OF INSULIN (HCC): ICD-10-CM

## 2018-01-05 DIAGNOSIS — R93.2 ABNORMAL LIVER CT: ICD-10-CM

## 2018-01-05 DIAGNOSIS — R05.9 COUGH: ICD-10-CM

## 2018-01-05 LAB
AFP-TM SERPL-MCNC: 3.4 NG/ML (ref 0–8.9)
ALBUMIN SERPL BCP-MCNC: 3.8 G/DL (ref 3.5–4.8)
ALBUMIN/GLOB SERPL: 1.1 {RATIO} (ref 1–2)
ALP SERPL-CCNC: 57 U/L (ref 32–100)
ALT SERPL-CCNC: 50 U/L (ref 17–63)
ANION GAP SERPL CALC-SCNC: 9 MMOL/L (ref 0–18)
AST SERPL-CCNC: 44 U/L (ref 15–41)
BACTERIA UR QL AUTO: NEGATIVE /HPF
BASOPHILS # BLD: 0 K/UL (ref 0–0.2)
BASOPHILS NFR BLD: 0 %
BILIRUB SERPL-MCNC: 1.1 MG/DL (ref 0.3–1.2)
BILIRUB UR QL: NEGATIVE
BNP SERPL-MCNC: 16 PG/ML (ref 0–100)
BUN SERPL-MCNC: 10 MG/DL (ref 8–20)
BUN/CREAT SERPL: 12.8 (ref 10–20)
CALCIUM SERPL-MCNC: 9 MG/DL (ref 8.5–10.5)
CHLORIDE SERPL-SCNC: 97 MMOL/L (ref 95–110)
CLARITY UR: CLEAR
CO2 SERPL-SCNC: 27 MMOL/L (ref 22–32)
COLOR UR: YELLOW
CREAT SERPL-MCNC: 0.78 MG/DL (ref 0.5–1.5)
EOSINOPHIL # BLD: 0.2 K/UL (ref 0–0.7)
EOSINOPHIL NFR BLD: 3 %
ERYTHROCYTE [DISTWIDTH] IN BLOOD BY AUTOMATED COUNT: 13 % (ref 11–15)
GLOBULIN PLAS-MCNC: 3.6 G/DL (ref 2.5–3.7)
GLUCOSE SERPL-MCNC: 348 MG/DL (ref 70–99)
GLUCOSE UR-MCNC: >=500 MG/DL
HCT VFR BLD AUTO: 44.1 % (ref 41–52)
HGB BLD-MCNC: 14.8 G/DL (ref 13.5–17.5)
HGB UR QL STRIP.AUTO: NEGATIVE
KETONES UR-MCNC: NEGATIVE MG/DL
LEUKOCYTE ESTERASE UR QL STRIP.AUTO: NEGATIVE
LYMPHOCYTES # BLD: 1.6 K/UL (ref 1–4)
LYMPHOCYTES NFR BLD: 25 %
MCH RBC QN AUTO: 30.2 PG (ref 27–32)
MCHC RBC AUTO-ENTMCNC: 33.6 G/DL (ref 32–37)
MCV RBC AUTO: 89.8 FL (ref 80–100)
MONOCYTES # BLD: 0.4 K/UL (ref 0–1)
MONOCYTES NFR BLD: 7 %
NEUTROPHILS # BLD AUTO: 4.2 K/UL (ref 1.8–7.7)
NEUTROPHILS NFR BLD: 65 %
NITRITE UR QL STRIP.AUTO: NEGATIVE
OSMOLALITY UR CALC.SUM OF ELEC: 289 MOSM/KG (ref 275–295)
PH UR: 5 [PH] (ref 5–8)
PLATELET # BLD AUTO: 132 K/UL (ref 140–400)
PMV BLD AUTO: 8.8 FL (ref 7.4–10.3)
POTASSIUM SERPL-SCNC: 4.6 MMOL/L (ref 3.3–5.1)
PROT SERPL-MCNC: 7.4 G/DL (ref 5.9–8.4)
PROT UR-MCNC: NEGATIVE MG/DL
PSA SERPL-MCNC: 0.5 NG/ML (ref 0–4)
RBC # BLD AUTO: 4.91 M/UL (ref 4.5–5.9)
RBC #/AREA URNS AUTO: <1 /HPF
SODIUM SERPL-SCNC: 133 MMOL/L (ref 136–144)
SP GR UR STRIP: 1.02 (ref 1–1.03)
UROBILINOGEN UR STRIP-ACNC: <2
VIT C UR-MCNC: NEGATIVE MG/DL
WBC # BLD AUTO: 6.4 K/UL (ref 4–11)
WBC #/AREA URNS AUTO: <1 /HPF

## 2018-01-05 PROCEDURE — 36415 COLL VENOUS BLD VENIPUNCTURE: CPT

## 2018-01-05 PROCEDURE — 83880 ASSAY OF NATRIURETIC PEPTIDE: CPT

## 2018-01-05 PROCEDURE — 81001 URINALYSIS AUTO W/SCOPE: CPT

## 2018-01-05 PROCEDURE — 82105 ALPHA-FETOPROTEIN SERUM: CPT

## 2018-01-05 PROCEDURE — 85025 COMPLETE CBC W/AUTO DIFF WBC: CPT

## 2018-01-05 PROCEDURE — 80053 COMPREHEN METABOLIC PANEL: CPT

## 2018-01-06 ENCOUNTER — TELEPHONE (OUTPATIENT)
Dept: INTERNAL MEDICINE CLINIC | Facility: CLINIC | Age: 68
End: 2018-01-06

## 2018-01-06 NOTE — TELEPHONE ENCOUNTER
Called patient, spoke with wife, regarding elevated blood sugar in high glucose in the urine. Advised him to check blood sugar you laterally, and his wife stated that he has been not checking it. Follow-up in office.

## 2018-01-10 ENCOUNTER — OFFICE VISIT (OUTPATIENT)
Dept: INTERNAL MEDICINE CLINIC | Facility: CLINIC | Age: 68
End: 2018-01-10

## 2018-01-10 VITALS
SYSTOLIC BLOOD PRESSURE: 138 MMHG | RESPIRATION RATE: 17 BRPM | WEIGHT: 186 LBS | TEMPERATURE: 99 F | BODY MASS INDEX: 27.55 KG/M2 | OXYGEN SATURATION: 98 % | DIASTOLIC BLOOD PRESSURE: 70 MMHG | HEIGHT: 69 IN | HEART RATE: 75 BPM

## 2018-01-10 DIAGNOSIS — R10.32 ABDOMINAL DISCOMFORT IN LEFT LOWER QUADRANT: ICD-10-CM

## 2018-01-10 DIAGNOSIS — I82.512 CHRONIC DEEP VEIN THROMBOSIS (DVT) OF FEMORAL VEIN OF LEFT LOWER EXTREMITY (HCC): Primary | ICD-10-CM

## 2018-01-10 DIAGNOSIS — R93.2 ABNORMAL LIVER CT: ICD-10-CM

## 2018-01-10 DIAGNOSIS — Z23 NEED FOR INFLUENZA VACCINATION: ICD-10-CM

## 2018-01-10 DIAGNOSIS — E11.9 TYPE 2 DIABETES MELLITUS WITHOUT COMPLICATION, WITHOUT LONG-TERM CURRENT USE OF INSULIN (HCC): ICD-10-CM

## 2018-01-10 PROCEDURE — G0008 ADMIN INFLUENZA VIRUS VAC: HCPCS | Performed by: INTERNAL MEDICINE

## 2018-01-10 PROCEDURE — 90653 IIV ADJUVANT VACCINE IM: CPT | Performed by: INTERNAL MEDICINE

## 2018-01-10 PROCEDURE — 99214 OFFICE O/P EST MOD 30 MIN: CPT | Performed by: INTERNAL MEDICINE

## 2018-01-10 RX ORDER — AZELASTINE HYDROCHLORIDE, FLUTICASONE PROPIONATE 137; 50 UG/1; UG/1
1 SPRAY, METERED NASAL DAILY
Qty: 1 BOTTLE | Refills: 0 | Status: SHIPPED | OUTPATIENT
Start: 2018-01-10

## 2018-01-10 NOTE — PROGRESS NOTES
María Elena  is a 78year old male. Patient presents with: Follow - Up: pt presents to clinic for follow up on lab results + ct abdomen. needs order for CLN       HPI:       Still mucus back of throat which he coughs up; today, nose is running.  Occur Diabetes Sister    • Diabetes Other      uncle   • Glaucoma Neg       Smoking status: Never Smoker                                                              Smokeless tobacco: Never Used                      Alcohol use:  Yes              Comment: sy, REFLEX   Result Value Ref Range   Urine Color Yellow Yellow   Clarity Urine Clear Clear   Spec Gravity 1.025 1.002 - 1.035   pH Urine 5.0 5.0 - 8.0   Protein Urine Negative Negative mg/dL   Glucose Urine >=500 (A) Negative mg/dL   Ketones Urine Negative Ne 7.7 K/UL   Lymphocyte Absolute 1.6 1.0 - 4.0 K/UL   Monocyte Absolute 0.4 0.0 - 1.0 K/UL   Eosinophil Absolute 0.2 0.0 - 0.7 K/UL   Basophil Absolute 0.0 0.0 - 0.2 K/UL        Xr Chest Pa + Lat Chest (wat=[id number])    Result Date: 12/13/2017  PROCEDURE: XR CH 12/28/2017 at 15:17     Approved by (CST): Israel Barnes MD on 12/28/2017 at 15:20          CONCLUSION:  1. Persistent now chronic nonocclusive thrombosis within the left femoral and popliteal veins.  These vessels demonstrate slightly improved flow and c measuring 1.4 cm. BLADDER: Moderately distended. PELVIC ORGANS: The prostate gland is enlarged. BONES: Multilevel degenerative changes of the visualized spine.  Bilateral pars defects at L5 noted with grade 1 anterolisthesis of L5 on S1. LUNG BASES: Th wife.  FBS approximately 107-135. Continue check hemoglobin A1c.    (R10.32) Abdominal discomfort in left lower quadrant  Plan: No evidence of radicular etiology, no masses felt on palpation.   Patient advised to complete stool for occult blood kit as prev

## 2018-01-10 NOTE — PROGRESS NOTES
FLUAD  injection/vaccine  0.5 mL administered IM  to the RT deltoid Vaccine/injection ordered by physician and documented within chart. Per physician able to administer vaccine/injection. Pt tolerated well. VIS provided and pt had no further questions.

## 2018-01-19 ENCOUNTER — OFFICE VISIT (OUTPATIENT)
Dept: HEMATOLOGY/ONCOLOGY | Facility: HOSPITAL | Age: 68
End: 2018-01-19
Attending: INTERNAL MEDICINE
Payer: COMMERCIAL

## 2018-01-19 ENCOUNTER — LAB ENCOUNTER (OUTPATIENT)
Dept: LAB | Facility: HOSPITAL | Age: 68
End: 2018-01-19
Attending: INTERNAL MEDICINE
Payer: COMMERCIAL

## 2018-01-19 VITALS
HEART RATE: 74 BPM | SYSTOLIC BLOOD PRESSURE: 122 MMHG | DIASTOLIC BLOOD PRESSURE: 67 MMHG | RESPIRATION RATE: 16 BRPM | HEIGHT: 69 IN | BODY MASS INDEX: 28.14 KG/M2 | TEMPERATURE: 98 F | WEIGHT: 190 LBS

## 2018-01-19 DIAGNOSIS — D69.6 THROMBOCYTOPENIA (HCC): ICD-10-CM

## 2018-01-19 DIAGNOSIS — Z00.00 HEALTHCARE MAINTENANCE: ICD-10-CM

## 2018-01-19 DIAGNOSIS — I82.402 ACUTE DEEP VEIN THROMBOSIS (DVT) OF LEFT LOWER EXTREMITY, UNSPECIFIED VEIN (HCC): Primary | ICD-10-CM

## 2018-01-19 DIAGNOSIS — I82.402 ACUTE DEEP VEIN THROMBOSIS (DVT) OF LEFT LOWER EXTREMITY, UNSPECIFIED VEIN (HCC): ICD-10-CM

## 2018-01-19 DIAGNOSIS — K76.89 NODULAR HYPERPLASIA OF LIVER: ICD-10-CM

## 2018-01-19 LAB
APTT PPP: 43.9 SECONDS (ref 23.2–35.3)
BASOPHILS # BLD: 0 K/UL (ref 0–0.2)
BASOPHILS NFR BLD: 0 %
D DIMER PPP FEU-MCNC: 0.36 MCG/ML (ref ?–0.66)
EOSINOPHIL # BLD: 0.2 K/UL (ref 0–0.7)
EOSINOPHIL NFR BLD: 2 %
ERYTHROCYTE [DISTWIDTH] IN BLOOD BY AUTOMATED COUNT: 13.3 % (ref 11–15)
HCT VFR BLD AUTO: 45.5 % (ref 41–52)
HGB BLD-MCNC: 15.4 G/DL (ref 13.5–17.5)
INR BLD: 1.6 (ref 0.9–1.2)
LYMPHOCYTES # BLD: 1.5 K/UL (ref 1–4)
LYMPHOCYTES NFR BLD: 23 %
MCH RBC QN AUTO: 30.3 PG (ref 27–32)
MCHC RBC AUTO-ENTMCNC: 33.8 G/DL (ref 32–37)
MCV RBC AUTO: 89.6 FL (ref 80–100)
MONOCYTES # BLD: 0.5 K/UL (ref 0–1)
MONOCYTES NFR BLD: 8 %
NEUTROPHILS # BLD AUTO: 4.5 K/UL (ref 1.8–7.7)
NEUTROPHILS NFR BLD: 67 %
PLATELET # BLD AUTO: 149 K/UL (ref 140–400)
PMV BLD AUTO: 8.3 FL (ref 7.4–10.3)
PROTHROMBIN TIME: 18.4 SECONDS (ref 11.8–14.5)
RBC # BLD AUTO: 5.08 M/UL (ref 4.5–5.9)
WBC # BLD AUTO: 6.7 K/UL (ref 4–11)

## 2018-01-19 PROCEDURE — 99245 OFF/OP CONSLTJ NEW/EST HI 55: CPT | Performed by: INTERNAL MEDICINE

## 2018-01-19 PROCEDURE — 85300 ANTITHROMBIN III ACTIVITY: CPT

## 2018-01-19 PROCEDURE — 85306 CLOT INHIBIT PROT S FREE: CPT

## 2018-01-19 PROCEDURE — 86147 CARDIOLIPIN ANTIBODY EA IG: CPT

## 2018-01-19 PROCEDURE — 81241 F5 GENE: CPT

## 2018-01-19 PROCEDURE — 36415 COLL VENOUS BLD VENIPUNCTURE: CPT

## 2018-01-19 PROCEDURE — 99212 OFFICE O/P EST SF 10 MIN: CPT | Performed by: INTERNAL MEDICINE

## 2018-01-19 PROCEDURE — 85730 THROMBOPLASTIN TIME PARTIAL: CPT

## 2018-01-19 PROCEDURE — 85025 COMPLETE CBC W/AUTO DIFF WBC: CPT

## 2018-01-19 PROCEDURE — 85303 CLOT INHIBIT PROT C ACTIVITY: CPT

## 2018-01-19 PROCEDURE — 85610 PROTHROMBIN TIME: CPT

## 2018-01-19 PROCEDURE — 81240 F2 GENE: CPT

## 2018-01-19 PROCEDURE — 85379 FIBRIN DEGRADATION QUANT: CPT

## 2018-01-19 NOTE — PROGRESS NOTES
Hematology Consultation Note    Patient Name: Ron Garcia   YOB: 1950   Medical Record Number: [id number]   CSN: [id number]   Consulting Physician: Estelle Ramirez MD  Referring Physician(s):  Nitin Rust  Date of Consultation: 1/19/ prevent his blood clot. Patient has no symptoms to suggest post phlebitis syndrome such as residual skin changes or pain in the affected limb.     Past Medical History:  Past Medical History:   Diagnosis Date   • Abdominal hernia     Navel Hernia   • Chick route daily.  Disp: 1 Bottle Rfl: 0   Rivaroxaban (XARELTO) 20 MG Oral Tab TAKE ONE TABLET BY MOUTH DAILY WITH FOOD Disp: 30 tablet Rfl: 2   METFORMIN HCL  MG Oral Tablet 24 Hr TAKE 2 TABLET BY MOUTH 2 TIMES A DAY WITH MEALS Disp: 120 tablet Rfl: 0 supple. Lymphatics: There is no palpable lymphadenopathy throughout in the cervical, supraclavicular, axillary, or inguinal regions. Chest: Clear to auscultation. No wheezes or rales. Heart: Regular rate and rhythm.  S1S2 normal.  Abdomen: Soft, non tend Thrombocytopenia ([facility] [address]  Plan: PROTHROMBIN TIME (PT), PTT, ACTIVATED  70-year-old male with no prior history of DVT and pertinent family history of DVT presents for evaluation of extensive left lower extremity DVT in April 2017    Plan:    1.) Left lower ex abdomen pelvis at follow-up in 3 months after the patient has completed his colonoscopy and above MRI abdomen imaging    Emotional Well Being:    The patient's emotional well being was assessed and resources were discussed.   Appropriate resources were revi

## 2018-01-22 ENCOUNTER — TELEPHONE (OUTPATIENT)
Dept: INTERNAL MEDICINE CLINIC | Facility: CLINIC | Age: 68
End: 2018-01-22

## 2018-01-22 LAB
F2 C.20210G>A GENO BLD/T: NORMAL
F5 P.R506Q BLD/T QL: NORMAL

## 2018-01-22 NOTE — TELEPHONE ENCOUNTER
Pt's spouse called and wants to know when the MRI has been approved by the ins. Please give her a call.

## 2018-01-23 LAB
AT III ACT/NOR PPP CHRO: 92 % NHP (ref 90–122)
PROT C ACT/NOR PPP: 57 % (ref 67–194)
PROT S ACT/NOR PPP: 117 % (ref 48–153)

## 2018-01-25 LAB
CARDIOLIPIN IGA SERPL-ACNC: 8.6 APL (ref 0–11.9)
CARDIOLIPIN IGG SERPL-ACNC: 9.2 GPL (ref 0–14.9)
CARDIOLIPIN IGM SERPL-ACNC: 7.5 MPL (ref 0–12.4)

## 2018-02-02 ENCOUNTER — HOSPITAL ENCOUNTER (OUTPATIENT)
Dept: MRI IMAGING | Facility: HOSPITAL | Age: 68
Discharge: HOME | End: 2018-02-02
Attending: INTERNAL MEDICINE
Payer: COMMERCIAL

## 2018-02-02 DIAGNOSIS — R93.2 ABNORMAL LIVER CT: ICD-10-CM

## 2018-02-02 PROCEDURE — A9575 INJ GADOTERATE MEGLUMI 0.1ML: HCPCS | Performed by: INTERNAL MEDICINE

## 2018-02-02 PROCEDURE — 74183 MRI ABD W/O CNTR FLWD CNTR: CPT | Performed by: INTERNAL MEDICINE

## 2018-02-11 ENCOUNTER — TELEPHONE (OUTPATIENT)
Dept: INTERNAL MEDICINE CLINIC | Facility: CLINIC | Age: 68
End: 2018-02-11

## 2018-02-12 NOTE — TELEPHONE ENCOUNTER
Discussed results of MRI liver with patient's wife, Pema Ramirez. She states they have an appointment with Dr. TIAN [facility] this coming week, and will follow up.

## 2018-02-14 ENCOUNTER — TELEPHONE (OUTPATIENT)
Dept: HEMATOLOGY/ONCOLOGY | Facility: HOSPITAL | Age: 68
End: 2018-02-14

## 2018-02-14 ENCOUNTER — LAB ENCOUNTER (OUTPATIENT)
Dept: LAB | Facility: HOSPITAL | Age: 68
End: 2018-02-14
Attending: INTERNAL MEDICINE
Payer: COMMERCIAL

## 2018-02-14 ENCOUNTER — TELEPHONE (OUTPATIENT)
Dept: GASTROENTEROLOGY | Facility: CLINIC | Age: 68
End: 2018-02-14

## 2018-02-14 ENCOUNTER — OFFICE VISIT (OUTPATIENT)
Dept: GASTROENTEROLOGY | Facility: CLINIC | Age: 68
End: 2018-02-14

## 2018-02-14 VITALS
HEIGHT: 69 IN | SYSTOLIC BLOOD PRESSURE: 157 MMHG | WEIGHT: 191 LBS | BODY MASS INDEX: 28.29 KG/M2 | HEART RATE: 72 BPM | DIASTOLIC BLOOD PRESSURE: 78 MMHG

## 2018-02-14 DIAGNOSIS — K74.60 CIRRHOSIS OF LIVER WITHOUT ASCITES, UNSPECIFIED HEPATIC CIRRHOSIS TYPE (HCC): Primary | ICD-10-CM

## 2018-02-14 DIAGNOSIS — K74.60 CIRRHOSIS OF LIVER WITHOUT ASCITES, UNSPECIFIED HEPATIC CIRRHOSIS TYPE (HCC): ICD-10-CM

## 2018-02-14 LAB
AFP-TM SERPL-MCNC: 3.8 NG/ML (ref 0–8.9)
FERRITIN SERPL IA-MCNC: 201 NG/ML (ref 24–336)
INR BLD: 2.2 (ref 0.9–1.2)
PROTHROMBIN TIME: 24.4 SECONDS (ref 11.8–14.5)

## 2018-02-14 PROCEDURE — 82390 ASSAY OF CERULOPLASMIN: CPT

## 2018-02-14 PROCEDURE — 87340 HEPATITIS B SURFACE AG IA: CPT

## 2018-02-14 PROCEDURE — 99212 OFFICE O/P EST SF 10 MIN: CPT | Performed by: INTERNAL MEDICINE

## 2018-02-14 PROCEDURE — 86376 MICROSOMAL ANTIBODY EACH: CPT

## 2018-02-14 PROCEDURE — 86709 HEPATITIS A IGM ANTIBODY: CPT

## 2018-02-14 PROCEDURE — 86803 HEPATITIS C AB TEST: CPT

## 2018-02-14 PROCEDURE — 86256 FLUORESCENT ANTIBODY TITER: CPT

## 2018-02-14 PROCEDURE — 86255 FLUORESCENT ANTIBODY SCREEN: CPT

## 2018-02-14 PROCEDURE — 86038 ANTINUCLEAR ANTIBODIES: CPT

## 2018-02-14 PROCEDURE — 99244 OFF/OP CNSLTJ NEW/EST MOD 40: CPT | Performed by: INTERNAL MEDICINE

## 2018-02-14 PROCEDURE — 82103 ALPHA-1-ANTITRYPSIN TOTAL: CPT

## 2018-02-14 PROCEDURE — 85610 PROTHROMBIN TIME: CPT

## 2018-02-14 PROCEDURE — 86704 HEP B CORE ANTIBODY TOTAL: CPT

## 2018-02-14 PROCEDURE — 86708 HEPATITIS A ANTIBODY: CPT

## 2018-02-14 PROCEDURE — 80500 HEPATITIS A B + C PROFILE: CPT

## 2018-02-14 PROCEDURE — 82105 ALPHA-FETOPROTEIN SERUM: CPT

## 2018-02-14 PROCEDURE — 86039 ANTINUCLEAR ANTIBODIES (ANA): CPT

## 2018-02-14 PROCEDURE — 82728 ASSAY OF FERRITIN: CPT

## 2018-02-14 PROCEDURE — 36415 COLL VENOUS BLD VENIPUNCTURE: CPT

## 2018-02-14 PROCEDURE — 86706 HEP B SURFACE ANTIBODY: CPT

## 2018-02-14 NOTE — TELEPHONE ENCOUNTER
Kiet Smith from Dr Maynor Jonas would like to speak with Gaurang Burroughs concerning Roberto Regan. She was reluctant to give me any detail beside I need to speak with the nurse.  Kiet Smith can reached at [phone] Please Advise

## 2018-02-14 NOTE — TELEPHONE ENCOUNTER
Scheduled for:  Colon/EGD  Provider Name: JOSUE  Date:  4-20-18  Location:  [facility]  Sedation:  MAC  Time:  8:30am, arrival 7:30am  Prep: Colyte  Meds/Allergies Reconciled?:  yes  Diagnosis with codes:  Cirrhosis K74.60  Was patient informed to call insurance wit

## 2018-02-14 NOTE — TELEPHONE ENCOUNTER
Also see if we canhold the xaralto 2 days before and possible 1 day after colonsocopy/EGD    Contact Dr. dAam Lacy office

## 2018-02-14 NOTE — TELEPHONE ENCOUNTER
Already sent message to Dr Kd Gómez re change in Xarelto orders below per Dr Tiffany Brooks. Pt's wife called back and wrote all instructions below.  States understanding

## 2018-02-14 NOTE — PROGRESS NOTES
Lteicia Vera is a 78year old male. HPI:   Patient presents with:   Other: recommended by primary to come here since he had an abnormal CT scan    The patient is a 60-year-old male who has a history of diabetes, DVT on Xarelto who has been noted on arden Problem Relation Age of Onset   • Diabetes Father    • Cancer Mother      colon cancer,  80   • Clotting Disorder Mother      post surgery development of DVT   • Diabetes Other      aunt   • Diabetes Sister    • Diabetes Other      uncle   • Cancer M distress  HEENT- anicteric sclera, neck no lymphadnopathy, OP- clear with no masses or lesions  Chest- Clear bilaterally, no wheezing,  Heart- regular rate, no murmur or gallop  Abdomen- Soft and nontender, nondistended, surgical scar below umbilicus.    Ex Prescriptions Disp Refills    PEG 3350-KCl-NaBcb-NaCl-NaSulf (COLYTE WITH FLAVOR PACKS) 240 g Oral Recon Soln 1 Bottle 0      Sig: Take 4,000 mL (4 L total) by mouth one time. Take as directed           Imaging & Referrals:  None     2/14/2018  Leon Angela.

## 2018-02-14 NOTE — TELEPHONE ENCOUNTER
I spoke to Arely Currie at Dr Michelle Roads office [phone]. She states they can see all epic documents, only need to fax the demographics and insurance card. Pt should call this number to set up appt. I faxed requested paperwork to her at [phone].  I s

## 2018-02-14 NOTE — PATIENT INSTRUCTIONS
Cirrhosis  - blood work today  - control diabetes and  Weight  - input from Dr. Flo Chavez at [facility]  - monitor liver lesion with repeat MRI scan 6 months  - colonoscopy and EGD with Colyte prep and MAC sedation  - hold diabetic medications day before  - follow up

## 2018-02-14 NOTE — TELEPHONE ENCOUNTER
LIONEL to Dr Camelia Sanchez re your specific Xarelto request below: Yaneli Broderick from Dr Jessie Leon office called.  Pt may hold Xarelto the day before colonoscopy/egd, and hold day of procedure, but should restart day after procedure --if there is an issue during the procedure

## 2018-02-15 LAB
ALPHA-1-ANTITRYPSIN: 112 MG/DL
CERULOPLASMIN: 21 MG/DL
HAV AB SER QL IA: REACTIVE
HAV IGM SERPL QL IA: NONREACTIVE
HBV CORE AB SERPL QL IA: NONREACTIVE
HBV SURFACE AB SER-ACNC: <3.1 MIU/ML (ref ?–10)
HBV SURFACE AG SERPL QL IA: NONREACTIVE
HBV SURFACE AG SERPL QL IA: NONREACTIVE
HCV AB SERPL QL IA: NONREACTIVE
NUCLEAR IGG TITR SER IF: POSITIVE {TITER}

## 2018-02-16 LAB
LKM-1 AB SER-ACNC: <20
MITOCHONDRIA AB TITR SER: <20 {TITER}
SMOOTH MUSCLE AB TITR SER: <20 {TITER}

## 2018-02-17 LAB — ANA NUCLEOLAR TITR SER IF: 80 {TITER}

## 2018-02-26 ENCOUNTER — TELEPHONE (OUTPATIENT)
Dept: GASTROENTEROLOGY | Facility: CLINIC | Age: 68
End: 2018-02-26

## 2018-02-26 NOTE — TELEPHONE ENCOUNTER
Received fax from [address] [place] [address] indicating PA is needed for bowel prep. This insurance needs verification that it is for a colonoscopy and it will be covered. Complete PA through  [facility] with KEY [id number] and will await for determination.  Will get notification v

## 2018-02-27 NOTE — TELEPHONE ENCOUNTER
Received fax from [address] [place] [address],[address] stating PA was approved from 2/26/18 - 3/26/18 but when I spoke to Demetra Goodpasture at [address] [place] [address] she stated that PA was NOT going through after attempting multiple times.      Contacted [facility] and spoke to Tony pino about the [employer]

## 2018-03-07 RX ORDER — GLIMEPIRIDE 2 MG/1
TABLET ORAL
Qty: 90 TABLET | Refills: 2 | Status: SHIPPED | OUTPATIENT
Start: 2018-03-07 | End: 2018-08-08

## 2018-03-07 NOTE — TELEPHONE ENCOUNTER
Contacted [employer] and spoke to St stahl who stated that the pharmacy already had a paid claim on the bowel prep as of 2/27/18 and will resend fax for approval of PA.       [address] [phone] and spoke to Jared Sequeira who stated that pt already pi

## 2018-03-21 ENCOUNTER — TELEPHONE (OUTPATIENT)
Dept: GASTROENTEROLOGY | Facility: CLINIC | Age: 68
End: 2018-03-21

## 2018-03-21 NOTE — TELEPHONE ENCOUNTER
----- Message from Ha Stafford MD sent at 3/19/2018  6:02 PM CDT -----  Lab work ok, slightly elevated JANIA but not likely clinically relevant. Vaccination for hepatitis B ( has antibody to hepatitis A ). RN to arrange series.      RN to see if th

## 2018-03-22 NOTE — TELEPHONE ENCOUNTER
Spouse called back (have MACY). Given info below. Pt has appt with Dr Frantz Hernandez April 9. Transferred to  for appt #1 Hep B vaccine. Explained series/reason to spouse. Thanks. May close encounter.

## 2018-03-22 NOTE — TELEPHONE ENCOUNTER
Below sent to Dr Arlene Bliss previously. Pt will be getting Hep B vaccine series at PCP office . Aware to due #2 one month later, and #3 6 months from the #1 vaccine.

## 2018-03-25 DIAGNOSIS — I82.402 ACUTE DEEP VEIN THROMBOSIS (DVT) OF LEFT LOWER EXTREMITY, UNSPECIFIED VEIN (HCC): ICD-10-CM

## 2018-03-26 NOTE — TELEPHONE ENCOUNTER
Left msg for pt to call back, last a1c 7/2017. Per Dr. Shakira Crawley would like to get labs done first + ov.

## 2018-03-26 NOTE — TELEPHONE ENCOUNTER
Spoke with pt doesn't have enough pills till un coming appt 4/4/2018, requesting #30 tablets   Would also like to get fasting labs done.  Can you please order

## 2018-03-27 DIAGNOSIS — I82.402 ACUTE DEEP VEIN THROMBOSIS (DVT) OF LEFT LOWER EXTREMITY, UNSPECIFIED VEIN (HCC): ICD-10-CM

## 2018-03-27 DIAGNOSIS — E13.9 DIABETES 1.5, MANAGED AS TYPE 2 (HCC): Primary | ICD-10-CM

## 2018-03-27 RX ORDER — METFORMIN HYDROCHLORIDE 500 MG/1
TABLET, EXTENDED RELEASE ORAL
Qty: 120 TABLET | Refills: 0 | Status: SHIPPED | OUTPATIENT
Start: 2018-03-27 | End: 2018-03-27

## 2018-03-27 RX ORDER — METFORMIN HYDROCHLORIDE 500 MG/1
TABLET, EXTENDED RELEASE ORAL
Qty: 120 TABLET | Refills: 0 | OUTPATIENT
Start: 2018-03-27

## 2018-03-28 ENCOUNTER — TELEPHONE (OUTPATIENT)
Dept: INTERNAL MEDICINE CLINIC | Facility: CLINIC | Age: 68
End: 2018-03-28

## 2018-03-28 DIAGNOSIS — I82.402 ACUTE DEEP VEIN THROMBOSIS (DVT) OF LEFT LOWER EXTREMITY, UNSPECIFIED VEIN (HCC): ICD-10-CM

## 2018-03-29 NOTE — TELEPHONE ENCOUNTER
Spoke with Pharmacist Terese Saez informed per Dr. Rosario Cody pt is to get regular metformin 500mg 2 tablets po bid qty:120. Terese Saez verbalized understanding, will put put prescription through.  No further questions

## 2018-04-04 ENCOUNTER — OFFICE VISIT (OUTPATIENT)
Dept: INTERNAL MEDICINE CLINIC | Facility: CLINIC | Age: 68
End: 2018-04-04

## 2018-04-04 VITALS
HEART RATE: 69 BPM | OXYGEN SATURATION: 99 % | WEIGHT: 187 LBS | TEMPERATURE: 98 F | DIASTOLIC BLOOD PRESSURE: 70 MMHG | RESPIRATION RATE: 16 BRPM | BODY MASS INDEX: 28 KG/M2 | SYSTOLIC BLOOD PRESSURE: 128 MMHG

## 2018-04-04 DIAGNOSIS — E11.8 TYPE 2 DIABETES MELLITUS WITH COMPLICATION, WITHOUT LONG-TERM CURRENT USE OF INSULIN (HCC): ICD-10-CM

## 2018-04-04 DIAGNOSIS — Z79.01 CURRENT USE OF ANTICOAGULANT THERAPY: ICD-10-CM

## 2018-04-04 DIAGNOSIS — R10.9 ABDOMINAL PAIN, UNSPECIFIED ABDOMINAL LOCATION: ICD-10-CM

## 2018-04-04 DIAGNOSIS — R93.2 ABNORMAL LIVER CT: ICD-10-CM

## 2018-04-04 DIAGNOSIS — R25.2 CRAMPS, EXTREMITY: ICD-10-CM

## 2018-04-04 DIAGNOSIS — K74.69 OTHER CIRRHOSIS OF LIVER (HCC): ICD-10-CM

## 2018-04-04 DIAGNOSIS — I82.532 CHRONIC DEEP VEIN THROMBOSIS (DVT) OF POPLITEAL VEIN OF LEFT LOWER EXTREMITY (HCC): Primary | ICD-10-CM

## 2018-04-04 DIAGNOSIS — D68.59 PROTEIN C DEFICIENCY (HCC): ICD-10-CM

## 2018-04-04 PROCEDURE — 99214 OFFICE O/P EST MOD 30 MIN: CPT | Performed by: INTERNAL MEDICINE

## 2018-04-04 NOTE — PROGRESS NOTES
Kelli Francis is a 78year old male.   Patient presents with:  Diabetes: pt presents to clinic for Diabetes check up.  needs referral for optho       HPI:       Taking 2 metformin twice daily, and 1 glimeperide twice daily  BS 98 in a.m., and 107 in afte • DVT (deep venous thrombosis) ([facility] 04/2017   • Hearing loss    • Herniated intervertebral disc of lumbar spine    • Measles    • Mumps    • Pneumonia 12-25-14   • Sinus problem       Past Surgical History:  No date: HAND/FINGER SURGERY UNLISTED Right Heart-S1-S2 normal, no S3 or murmur. Rhythm regular. Abdomen-bowel sounds normal, no organomegaly. No masses. Abdomen is soft, with somewhat localized tenderness at left mid lateral abdomen on deep palpation only.   No definite flank tenderness, no re Above labs reviewed with patient and wife.     Assessment/Plan:  (I82.532) Chronic deep vein thrombosis (DVT) of popliteal vein of left lower extremity (HCC)  (primary encounter diagnosis)  Plan: US VENOUS DOPPLER LEG LEFT - DIAG IMG         (CPT=939 per MRI. To follow-up with gastroenterology specialist    (I24.61) Protein C deficiency ([facility]  Plan: Protein C deficiency with DVT, extensive. To follow-up with hematology.   Continue Xarelto             Imaging & Consults:  None    Meds & Refills for thi

## 2018-04-09 ENCOUNTER — OFFICE VISIT (OUTPATIENT)
Dept: SURGERY | Facility: CLINIC | Age: 68
End: 2018-04-09

## 2018-04-09 ENCOUNTER — LAB ENCOUNTER (OUTPATIENT)
Dept: LAB | Facility: HOSPITAL | Age: 68
End: 2018-04-09
Attending: INTERNAL MEDICINE
Payer: COMMERCIAL

## 2018-04-09 VITALS
HEIGHT: 69 IN | TEMPERATURE: 98 F | BODY MASS INDEX: 27.96 KG/M2 | SYSTOLIC BLOOD PRESSURE: 147 MMHG | OXYGEN SATURATION: 98 % | RESPIRATION RATE: 18 BRPM | WEIGHT: 188.81 LBS | DIASTOLIC BLOOD PRESSURE: 79 MMHG | HEART RATE: 75 BPM

## 2018-04-09 DIAGNOSIS — K76.9 LIVER LESION: Primary | ICD-10-CM

## 2018-04-09 DIAGNOSIS — R25.2 CRAMPS, EXTREMITY: ICD-10-CM

## 2018-04-09 DIAGNOSIS — K74.60 LIVER CIRRHOSIS SECONDARY TO NASH (HCC): ICD-10-CM

## 2018-04-09 DIAGNOSIS — Z79.01 CURRENT USE OF ANTICOAGULANT THERAPY: ICD-10-CM

## 2018-04-09 DIAGNOSIS — E13.9 DIABETES 1.5, MANAGED AS TYPE 2 (HCC): ICD-10-CM

## 2018-04-09 DIAGNOSIS — K75.81 LIVER CIRRHOSIS SECONDARY TO NASH (HCC): ICD-10-CM

## 2018-04-09 PROCEDURE — 83735 ASSAY OF MAGNESIUM: CPT

## 2018-04-09 PROCEDURE — 80061 LIPID PANEL: CPT

## 2018-04-09 PROCEDURE — 83036 HEMOGLOBIN GLYCOSYLATED A1C: CPT

## 2018-04-09 PROCEDURE — 85025 COMPLETE CBC W/AUTO DIFF WBC: CPT

## 2018-04-09 PROCEDURE — 36415 COLL VENOUS BLD VENIPUNCTURE: CPT

## 2018-04-09 PROCEDURE — 80053 COMPREHEN METABOLIC PANEL: CPT

## 2018-04-09 NOTE — PROGRESS NOTES
[facility] at [facility]  [address] [facility] [address], [address]  [address]  [facility]  [phone] ([phone]) gender specific   • Clotting Disorder Maternal Grandfather       of DVT in legs   • Glaucoma Neg    • Bleeding Disorders Neg    • Anemia Neg       Smoking status: Never Smoker                                                              Smokeless tobac months    Michael De Leon MD  Director of Hepatology  Medical Director of [address] [place] [address] of [facility] [address], 7th floor, [place], [place],  [facility] (office)  [phone] (fax)

## 2018-04-20 ENCOUNTER — ANESTHESIA EVENT (OUTPATIENT)
Dept: ENDOSCOPY | Facility: HOSPITAL | Age: 68
End: 2018-04-20
Payer: COMMERCIAL

## 2018-04-20 ENCOUNTER — ANESTHESIA (OUTPATIENT)
Dept: ENDOSCOPY | Facility: HOSPITAL | Age: 68
End: 2018-04-20
Payer: COMMERCIAL

## 2018-04-20 ENCOUNTER — HOSPITAL ENCOUNTER (OUTPATIENT)
Facility: HOSPITAL | Age: 68
Setting detail: HOSPITAL OUTPATIENT SURGERY
Discharge: HOME | End: 2018-04-20
Attending: INTERNAL MEDICINE | Admitting: INTERNAL MEDICINE
Payer: COMMERCIAL

## 2018-04-20 ENCOUNTER — APPOINTMENT (OUTPATIENT)
Dept: HEMATOLOGY/ONCOLOGY | Facility: HOSPITAL | Age: 68
End: 2018-04-20
Attending: INTERNAL MEDICINE
Payer: COMMERCIAL

## 2018-04-20 ENCOUNTER — TELEPHONE (OUTPATIENT)
Dept: GASTROENTEROLOGY | Facility: CLINIC | Age: 68
End: 2018-04-20

## 2018-04-20 ENCOUNTER — SURGERY (OUTPATIENT)
Age: 68
End: 2018-04-20

## 2018-04-20 VITALS
DIASTOLIC BLOOD PRESSURE: 68 MMHG | SYSTOLIC BLOOD PRESSURE: 117 MMHG | RESPIRATION RATE: 16 BRPM | BODY MASS INDEX: 27.4 KG/M2 | WEIGHT: 185 LBS | OXYGEN SATURATION: 96 % | HEART RATE: 58 BPM | HEIGHT: 69 IN

## 2018-04-20 DIAGNOSIS — K63.5 POLYP OF CECUM: ICD-10-CM

## 2018-04-20 DIAGNOSIS — K74.60 CIRRHOSIS OF LIVER WITHOUT ASCITES, UNSPECIFIED HEPATIC CIRRHOSIS TYPE (HCC): ICD-10-CM

## 2018-04-20 DIAGNOSIS — K44.9 HIATAL HERNIA: ICD-10-CM

## 2018-04-20 DIAGNOSIS — K29.70 GASTRITIS WITHOUT BLEEDING, UNSPECIFIED CHRONICITY, UNSPECIFIED GASTRITIS TYPE: ICD-10-CM

## 2018-04-20 DIAGNOSIS — K57.90 DIVERTICULOSIS: ICD-10-CM

## 2018-04-20 DIAGNOSIS — K63.5 POLYP OF ASCENDING COLON, UNSPECIFIED TYPE: ICD-10-CM

## 2018-04-20 DIAGNOSIS — K20.90 ESOPHAGITIS: Primary | ICD-10-CM

## 2018-04-20 DIAGNOSIS — K63.5 POLYP OF TRANSVERSE COLON, UNSPECIFIED TYPE: ICD-10-CM

## 2018-04-20 PROCEDURE — 45380 COLONOSCOPY AND BIOPSY: CPT | Performed by: INTERNAL MEDICINE

## 2018-04-20 PROCEDURE — 0DBK8ZX EXCISION OF ASCENDING COLON, VIA NATURAL OR ARTIFICIAL OPENING ENDOSCOPIC, DIAGNOSTIC: ICD-10-PCS | Performed by: INTERNAL MEDICINE

## 2018-04-20 PROCEDURE — 45385 COLONOSCOPY W/LESION REMOVAL: CPT | Performed by: INTERNAL MEDICINE

## 2018-04-20 PROCEDURE — 0DBH8ZX EXCISION OF CECUM, VIA NATURAL OR ARTIFICIAL OPENING ENDOSCOPIC, DIAGNOSTIC: ICD-10-PCS | Performed by: INTERNAL MEDICINE

## 2018-04-20 PROCEDURE — 0DBP8ZX EXCISION OF RECTUM, VIA NATURAL OR ARTIFICIAL OPENING ENDOSCOPIC, DIAGNOSTIC: ICD-10-PCS | Performed by: INTERNAL MEDICINE

## 2018-04-20 PROCEDURE — 3E0H8GC INTRODUCTION OF OTHER THERAPEUTIC SUBSTANCE INTO LOWER GI, VIA NATURAL OR ARTIFICIAL OPENING ENDOSCOPIC: ICD-10-PCS | Performed by: INTERNAL MEDICINE

## 2018-04-20 PROCEDURE — 0DBL8ZX EXCISION OF TRANSVERSE COLON, VIA NATURAL OR ARTIFICIAL OPENING ENDOSCOPIC, DIAGNOSTIC: ICD-10-PCS | Performed by: INTERNAL MEDICINE

## 2018-04-20 PROCEDURE — 43235 EGD DIAGNOSTIC BRUSH WASH: CPT | Performed by: INTERNAL MEDICINE

## 2018-04-20 PROCEDURE — 45381 COLONOSCOPY SUBMUCOUS NJX: CPT | Performed by: INTERNAL MEDICINE

## 2018-04-20 RX ORDER — DEXTROSE MONOHYDRATE 25 G/50ML
50 INJECTION, SOLUTION INTRAVENOUS
Status: DISCONTINUED | OUTPATIENT
Start: 2018-04-20 | End: 2018-04-20

## 2018-04-20 RX ORDER — NALOXONE HYDROCHLORIDE 0.4 MG/ML
80 INJECTION, SOLUTION INTRAMUSCULAR; INTRAVENOUS; SUBCUTANEOUS AS NEEDED
Status: DISCONTINUED | OUTPATIENT
Start: 2018-04-20 | End: 2018-04-20

## 2018-04-20 RX ORDER — SODIUM CHLORIDE, SODIUM LACTATE, POTASSIUM CHLORIDE, CALCIUM CHLORIDE 600; 310; 30; 20 MG/100ML; MG/100ML; MG/100ML; MG/100ML
INJECTION, SOLUTION INTRAVENOUS CONTINUOUS
Status: DISCONTINUED | OUTPATIENT
Start: 2018-04-20 | End: 2018-04-20

## 2018-04-20 RX ORDER — MIDAZOLAM HYDROCHLORIDE 1 MG/ML
INJECTION INTRAMUSCULAR; INTRAVENOUS AS NEEDED
Status: DISCONTINUED | OUTPATIENT
Start: 2018-04-20 | End: 2018-04-20 | Stop reason: SURG

## 2018-04-20 RX ORDER — LIDOCAINE HYDROCHLORIDE 10 MG/ML
INJECTION, SOLUTION EPIDURAL; INFILTRATION; INTRACAUDAL; PERINEURAL AS NEEDED
Status: DISCONTINUED | OUTPATIENT
Start: 2018-04-20 | End: 2018-04-20 | Stop reason: SURG

## 2018-04-20 RX ORDER — OMEPRAZOLE 40 MG/1
40 CAPSULE, DELAYED RELEASE ORAL DAILY
Qty: 30 CAPSULE | Refills: 2 | Status: SHIPPED | OUTPATIENT
Start: 2018-04-20 | End: 2018-07-20

## 2018-04-20 RX ADMIN — LIDOCAINE HYDROCHLORIDE 25 MG: 10 INJECTION, SOLUTION EPIDURAL; INFILTRATION; INTRACAUDAL; PERINEURAL at 08:58:00

## 2018-04-20 RX ADMIN — SODIUM CHLORIDE, SODIUM LACTATE, POTASSIUM CHLORIDE, CALCIUM CHLORIDE: 600; 310; 30; 20 INJECTION, SOLUTION INTRAVENOUS at 08:54:00

## 2018-04-20 RX ADMIN — SODIUM CHLORIDE, SODIUM LACTATE, POTASSIUM CHLORIDE, CALCIUM CHLORIDE: 600; 310; 30; 20 INJECTION, SOLUTION INTRAVENOUS at 09:50:00

## 2018-04-20 RX ADMIN — MIDAZOLAM HYDROCHLORIDE 2 MG: 1 INJECTION INTRAMUSCULAR; INTRAVENOUS at 08:58:00

## 2018-04-20 NOTE — ANESTHESIA PREPROCEDURE EVALUATION
Anesthesia PreOp Note    HPI:     Valeria Mcclellan is a 78year old male who presents for preoperative consultation requested by: Kayla Leroy MD    Date of Surgery: 4/20/2018    Procedure(s):  COLONOSCOPY  ESOPHAGOGASTRODUODENOSCOPY (EGD)  Indication: Rivaroxaban (XARELTO) 20 MG Oral Tab TAKE ONE TABLET BY MOUTH DAILY WITH FOOD Disp: 30 tablet Rfl: 2 4/18/2018 at 0600   MetFORMIN HCl 500 MG Oral Tab 2 tablets po bid Disp: 120 tablet Rfl: 0 4/11/2018 at 1800   GLIMEPIRIDE 2 MG Oral Tab TAKE TWO TABLETS B Available pre-op labs reviewed.     Lab Results  Component Value Date   WBC 7.4 04/09/2018   RBC 5.03 04/09/2018   HGB 15.2 04/09/2018   HCT 44.9 04/09/2018   MCV 89.3 04/09/2018   MCH 30.3 04/09/2018   MCHC 33.9 04/09/2018   RDW 13.3 04/09/2018    0 I have informed Heber Panchal  of the nature of the anesthetic plan, benefits, risks, major complications, and any alternative forms of anesthetic management. All of the patient's questions were answered to the best of my ability.  The patient desires th

## 2018-04-20 NOTE — ANESTHESIA POSTPROCEDURE EVALUATION
Patient: Kaia Miller    Procedure Summary     Date:  04/20/18 Room / Location:  [address] [place] [address] ENDOSCOPY 01 / [address] [place] [address] ENDOSCOPY    Anesthesia Start:  9490 Anesthesia Stop:  7154    Procedures:       COLONOSCOPY (N/A )      ESOPHAGOGASTRODUODENOSCOPY (EGD) (N/A ) Diagn

## 2018-04-20 NOTE — TELEPHONE ENCOUNTER
Keegan  Large polyps removed today. How long can I hold Xaralto? He has been off for 2 days before and I told him not to take it today - based on risks could I hold any longer or restart Saturday/Sunday?    Thanks Cele Clark

## 2018-04-20 NOTE — OPERATIVE REPORT
[facility] HOSP - [facility] Endoscopy Report      Preoperative Diagnosis:  - colon screening  - history of cirrhosis- screening for esophageal varices      Postoperative Diagnosis:  - colon polyps x 8   - diverticulosis  - internal hemorrhoids  - gastritis junction/valve a 2 cm area of polypoid appearing tissue was noted. This was biopsied but not removed. - ascending x1 sessile 4 mm polyp cold snare excised. - transverse x 2, each polyp was approximately 4-5 mm, sessile and cold snare excised.   - rectum

## 2018-04-20 NOTE — H&P
History & Physical Examination    Patient Name: Rena Adams  MRN: [id number]  [facility]: [id number]  YOB: 1950    Diagnosis: colon screening, varices screening    Prescriptions Prior to Admission:  Loratadine-Pseudoephedrine (CLARITIN-D 24 HOUR O • Clotting Disorder Maternal Grandfather       of DVT in legs   • Glaucoma Neg    • Bleeding Disorders Neg    • Anemia Neg         Smoking status: Never Smoker    Smokeless tobacco: Never Used    Alcohol use Yes    Comment: beer, 1 glass, socially

## 2018-04-26 ENCOUNTER — APPOINTMENT (OUTPATIENT)
Dept: ULTRASOUND IMAGING | Facility: HOSPITAL | Age: 68
End: 2018-04-26
Attending: EMERGENCY MEDICINE
Payer: COMMERCIAL

## 2018-04-26 ENCOUNTER — TELEPHONE (OUTPATIENT)
Dept: GASTROENTEROLOGY | Facility: CLINIC | Age: 68
End: 2018-04-26

## 2018-04-26 ENCOUNTER — HOSPITAL ENCOUNTER (EMERGENCY)
Facility: HOSPITAL | Age: 68
Discharge: HOME | End: 2018-04-26
Attending: EMERGENCY MEDICINE
Payer: COMMERCIAL

## 2018-04-26 VITALS
SYSTOLIC BLOOD PRESSURE: 135 MMHG | DIASTOLIC BLOOD PRESSURE: 69 MMHG | RESPIRATION RATE: 18 BRPM | HEIGHT: 69 IN | BODY MASS INDEX: 27.7 KG/M2 | OXYGEN SATURATION: 96 % | HEART RATE: 64 BPM | TEMPERATURE: 97 F | WEIGHT: 187 LBS

## 2018-04-26 DIAGNOSIS — K92.2 ACUTE LOWER GI BLEEDING: Primary | ICD-10-CM

## 2018-04-26 PROCEDURE — 80048 BASIC METABOLIC PNL TOTAL CA: CPT | Performed by: EMERGENCY MEDICINE

## 2018-04-26 PROCEDURE — 93971 EXTREMITY STUDY: CPT | Performed by: EMERGENCY MEDICINE

## 2018-04-26 PROCEDURE — 36415 COLL VENOUS BLD VENIPUNCTURE: CPT

## 2018-04-26 PROCEDURE — 85025 COMPLETE CBC W/AUTO DIFF WBC: CPT | Performed by: EMERGENCY MEDICINE

## 2018-04-26 PROCEDURE — 86900 BLOOD TYPING SEROLOGIC ABO: CPT | Performed by: EMERGENCY MEDICINE

## 2018-04-26 PROCEDURE — 99284 EMERGENCY DEPT VISIT MOD MDM: CPT

## 2018-04-26 PROCEDURE — 86850 RBC ANTIBODY SCREEN: CPT | Performed by: EMERGENCY MEDICINE

## 2018-04-26 PROCEDURE — 86901 BLOOD TYPING SEROLOGIC RH(D): CPT | Performed by: EMERGENCY MEDICINE

## 2018-04-26 PROCEDURE — 85610 PROTHROMBIN TIME: CPT | Performed by: EMERGENCY MEDICINE

## 2018-04-26 NOTE — TELEPHONE ENCOUNTER
Call transferred to RN:    Pt wife, Tatum, states that pt had CLN/EGD on 4/20/18 and resumed his Xarelto on Sunday (4/22/18). She states the pt had a BM this morning and noticed \"not a whole lot of blood, but enough to change the toilet water. \"  States

## 2018-04-26 NOTE — TELEPHONE ENCOUNTER
Call transferred RN:    Pt wife, Aditi Murrieta, called back to review Dr. Tameka Laurent message below.  I reviewed the message and she states the pt did not inform her had to come to the ED, and he \"just needed a blood test.\" I reviewed Dr. Tameka Laurent message again and informed he

## 2018-04-26 NOTE — TELEPHONE ENCOUNTER
Pt contacted and discussed, he had 1 bm with some bright red blood this morning at 6 AM.    Restarted Xaralto on Sunday - did well until today  No dizzy or SOB, no chest pain.    No further bleeding     He was instructed to have his wife bring him to the ER

## 2018-04-26 NOTE — CONSULTS
[facility] AND [facility]  Report of Consultation    Ashley Mock Patient Status:  Emergency    1950 MRN [id number]   Location [address]  Alicia Farmer Day # 0 PCP Farshad Castillo MD     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

## 2018-04-26 NOTE — ED NOTES
Pt was explained the risks of leaving AMA and strongly encouraged to return to the ED or call 911 if symptoms became worse.

## 2018-04-26 NOTE — TELEPHONE ENCOUNTER
Pts wife Magi Grullon states that pt had colonoscopy on 4/20/18 and is having some rectal bleeding today. Pt is on xarelto. Call transferred to RN.

## 2018-04-26 NOTE — ED INITIAL ASSESSMENT (HPI)
Zohra Oviedo MD sent pt to ED after having 2 episodes of bright red bleeding with stool since yesterday- states had routine colonoscopy this past Friday- resumed Xarelto this past Sunday- and now has bleeding. Denies pain. 8 polyps removed during colonoscopy.

## 2018-04-26 NOTE — ED PROVIDER NOTES
Patient Seen in: [facility] Emergency Department    History   Patient presents with:  GI Bleeding (gastrointestinal)    Stated Complaint: Pt had colonoscopy this past Friday- resumed Xarelto this past Sunday and now h*    HPI    79-year-old male p Neurological: Negative. All other systems reviewed and are negative. Positive for stated complaint: Pt had colonoscopy this past Friday- resumed Xarelto this past Sunday and now h*  Other systems are as noted in HPI.   Constitutional and vital sig CBC W/ DIFFERENTIAL[[id number]]                              Final result                 Please view results for these tests on the individual orders. TYPE AND SCREEN    Narrative:      The following orders were created for panel order TYPE AND SC discussed with the patient. Patient is advised to follow up with PCP for reevaluation. Return precautions were given. Patient voices understanding and agreement with the treatment plan. All questions were addressed and answered.                     Disposit

## 2018-04-27 ENCOUNTER — TELEPHONE (OUTPATIENT)
Dept: GASTROENTEROLOGY | Facility: CLINIC | Age: 68
End: 2018-04-27

## 2018-04-27 NOTE — TELEPHONE ENCOUNTER
The patient is awake was contacted, the patient had no further rectal bleeding. He feels well. I discussed the patient's anticoagulation with Dr. Sharon Fernandez his hematologist.  Dr. Sharon Fernandez stated that we could stop his Xarelto at this time.     I discussed the Constricted

## 2018-04-27 NOTE — TELEPHONE ENCOUNTER
Pts wife calling to let dr know that pt. Does not have anymore bleeding and seems to be doing fine today. Pts wife wants to know if pt. Should continue to be off of the Kelsea and when should he start taking med again?

## 2018-05-11 ENCOUNTER — OFFICE VISIT (OUTPATIENT)
Dept: HEMATOLOGY/ONCOLOGY | Facility: HOSPITAL | Age: 68
End: 2018-05-11
Attending: INTERNAL MEDICINE
Payer: COMMERCIAL

## 2018-05-11 ENCOUNTER — APPOINTMENT (OUTPATIENT)
Dept: LAB | Facility: HOSPITAL | Age: 68
End: 2018-05-11
Attending: INTERNAL MEDICINE
Payer: COMMERCIAL

## 2018-05-11 VITALS
SYSTOLIC BLOOD PRESSURE: 146 MMHG | RESPIRATION RATE: 18 BRPM | BODY MASS INDEX: 27.85 KG/M2 | TEMPERATURE: 99 F | HEART RATE: 70 BPM | DIASTOLIC BLOOD PRESSURE: 67 MMHG | WEIGHT: 188 LBS | HEIGHT: 69 IN

## 2018-05-11 DIAGNOSIS — K74.69 OTHER CIRRHOSIS OF LIVER (HCC): ICD-10-CM

## 2018-05-11 DIAGNOSIS — I82.402 ACUTE DEEP VEIN THROMBOSIS (DVT) OF LEFT LOWER EXTREMITY, UNSPECIFIED VEIN (HCC): Primary | ICD-10-CM

## 2018-05-11 DIAGNOSIS — I82.402 ACUTE DEEP VEIN THROMBOSIS (DVT) OF LEFT LOWER EXTREMITY, UNSPECIFIED VEIN (HCC): ICD-10-CM

## 2018-05-11 DIAGNOSIS — Z86.010 HISTORY OF COLON POLYPS: ICD-10-CM

## 2018-05-11 PROCEDURE — 99214 OFFICE O/P EST MOD 30 MIN: CPT | Performed by: INTERNAL MEDICINE

## 2018-05-11 PROCEDURE — 85390 FIBRINOLYSINS SCREEN I&R: CPT

## 2018-05-11 PROCEDURE — 85730 THROMBOPLASTIN TIME PARTIAL: CPT

## 2018-05-11 PROCEDURE — 36415 COLL VENOUS BLD VENIPUNCTURE: CPT

## 2018-05-11 PROCEDURE — 85613 RUSSELL VIPER VENOM DILUTED: CPT

## 2018-05-11 PROCEDURE — 85610 PROTHROMBIN TIME: CPT

## 2018-05-11 NOTE — PROGRESS NOTES
Hematology Progress Note    Patient Name: Kelli Francis   YOB: 1950   Medical Record Number: [id number]   CSN: [id number]  Consulting Physician: May Mead MD  Referring Physician(s):  Leonard Rust  Date of Visit: 5/11/2018     Chi clot.  Patient has no symptoms to suggest post phlebitis syndrome such as residual skin changes or pain in the affected limb. Interval History:  [facility] returns with his wife today after completion of 1 year anticoagulation with Xarelto.   He is already un Disorder Maternal Grandfather       of DVT in legs   • Glaucoma Neg    • Bleeding Disorders Neg    • Anemia Neg        Social History:    Social History  Social History   Marital status:   Spouse name: N/A    Years of education: N/A  Number of c abdominal pain. Integument/breast: Negative for rash, skin lesions, and pruritus. Hematologic/lymphatic: Negative for easy bruising, bleeding, and lymphadenopathy. Musculoskeletal: Negative for myalgias, arthralgias, muscle weakness.   Neurological: Nega AST 37 04/09/2018 08:09 AM   ALT 49 04/09/2018 08:09 AM           Impression:    (I82.402) Acute deep vein thrombosis (DVT) of left lower extremity, unspecified vein (HCC)  (primary encounter diagnosis)  Plan: LUPUS ANTICOAGULANT COMP  69-year-old male w procedure for polypectomy      Emotional Well Being:    The patient's emotional well being was assessed and resources were discussed. Appropriate resources were reviewed and discussed with the pateint and family.      I spent 40 minutes face to face with alejandro

## 2018-05-14 ENCOUNTER — TELEPHONE (OUTPATIENT)
Dept: HEMATOLOGY/ONCOLOGY | Facility: HOSPITAL | Age: 68
End: 2018-05-14

## 2018-05-14 DIAGNOSIS — I82.409 DEEP VEIN THROMBOSIS (DVT) (HCC): Primary | ICD-10-CM

## 2018-05-14 NOTE — TELEPHONE ENCOUNTER
LM on identified voice mail: lab results explained as being positive but per Dr Emmanuelle Hurt not clinically significant. Will need to be repeated again in 6 months time. Please call back with any questions.

## 2018-05-29 NOTE — TELEPHONE ENCOUNTER
Dr Bella Lake, I noted in imaging tab that pt has MRI scheduled June 1 with Dr Lance Boeck order. Should this be cancelled and rescheduled for 3 months in August?  Please advise. I left message on both voicemails to call me. Also have MACY to speak with spouse.  Lavern Sorto

## 2018-05-29 NOTE — TELEPHONE ENCOUNTER
Dr Belinda Palma, pt's wife called back. Per below, please advise on Dr Pastor Urena MRI ordered that is scheduled for this Friday--should pt proceed, and if so, would you want your MRI recall out farther? Thanks.

## 2018-05-29 NOTE — TELEPHONE ENCOUNTER
Dr. Angela Neri has MRI ( 3 month follow up exam) for now /May,  I would like to see results and then place on call back from there - so yes we will likely need to change my follow up

## 2018-06-01 ENCOUNTER — HOSPITAL ENCOUNTER (OUTPATIENT)
Dept: MRI IMAGING | Facility: HOSPITAL | Age: 68
Discharge: HOME | End: 2018-06-01
Attending: INTERNAL MEDICINE
Payer: COMMERCIAL

## 2018-06-01 DIAGNOSIS — K76.9 LIVER LESION: ICD-10-CM

## 2018-06-01 DIAGNOSIS — K75.81 LIVER CIRRHOSIS SECONDARY TO NASH (HCC): ICD-10-CM

## 2018-06-01 DIAGNOSIS — K74.60 LIVER CIRRHOSIS SECONDARY TO NASH (HCC): ICD-10-CM

## 2018-06-01 PROCEDURE — 74183 MRI ABD W/O CNTR FLWD CNTR: CPT | Performed by: INTERNAL MEDICINE

## 2018-06-01 PROCEDURE — A9576 INJ PROHANCE MULTIPACK: HCPCS | Performed by: INTERNAL MEDICINE

## 2018-06-11 ENCOUNTER — OFFICE VISIT (OUTPATIENT)
Dept: SURGERY | Facility: CLINIC | Age: 68
End: 2018-06-11

## 2018-06-11 ENCOUNTER — APPOINTMENT (OUTPATIENT)
Dept: LAB | Facility: HOSPITAL | Age: 68
End: 2018-06-11
Attending: NURSE PRACTITIONER
Payer: COMMERCIAL

## 2018-06-11 VITALS
OXYGEN SATURATION: 97 % | HEIGHT: 69 IN | RESPIRATION RATE: 20 BRPM | SYSTOLIC BLOOD PRESSURE: 144 MMHG | WEIGHT: 189.19 LBS | HEART RATE: 73 BPM | BODY MASS INDEX: 28.02 KG/M2 | DIASTOLIC BLOOD PRESSURE: 77 MMHG

## 2018-06-11 DIAGNOSIS — K74.60 CIRRHOSIS OF LIVER WITHOUT ASCITES, UNSPECIFIED HEPATIC CIRRHOSIS TYPE (HCC): Primary | ICD-10-CM

## 2018-06-11 DIAGNOSIS — K74.60 CIRRHOSIS OF LIVER WITHOUT ASCITES, UNSPECIFIED HEPATIC CIRRHOSIS TYPE (HCC): ICD-10-CM

## 2018-06-11 PROCEDURE — 36415 COLL VENOUS BLD VENIPUNCTURE: CPT

## 2018-06-11 PROCEDURE — 80076 HEPATIC FUNCTION PANEL: CPT

## 2018-06-11 PROCEDURE — 82105 ALPHA-FETOPROTEIN SERUM: CPT | Performed by: NURSE PRACTITIONER

## 2018-06-11 NOTE — PROGRESS NOTES
[facility] at [facility]  [address] [facility], [address]  [address]  [phone] ([phone]) polyuria(-), polydipsia(-)  Neuro: loss of strength/weakness (-)     HISTORY:  Past Medical History:   Diagnosis Date   • Abdominal hernia     Navel Hernia   • Bleeding disorder ([facility] 2017   • Chickenpox    • Decorative tattoo    • Diabetes ([facility]    • Diab THE P.M. Disp: 90 tablet Rfl: 2   Azelastine-Fluticasone (DYMISTA) 137-50 MCG/ACT Nasal Suspension 1 spray by Nasal route daily.  Disp: 1 Bottle Rfl: 0   Glucose Blood (ONETOUCH ULTRA BLUE) In Vitro Strip Test blood sugar daily Dx E11.9 Disp: 50 strip Rfl: likely reactive. BONES:             Suboptimally assessed by scan protocol but unremarkable. OTHER:             Negative. CONCLUSION:   1. Cirrhosis superimposed on a background of steatosis. No imaging manifestations of portal hypertension.   2. N (fax)  [email or web address]

## 2018-06-18 ENCOUNTER — OFFICE VISIT (OUTPATIENT)
Dept: OPTOMETRY | Facility: CLINIC | Age: 68
End: 2018-06-18

## 2018-06-18 DIAGNOSIS — E11.9 DIABETES MELLITUS, STABLE (HCC): Primary | ICD-10-CM

## 2018-06-18 DIAGNOSIS — H25.13 AGE-RELATED NUCLEAR CATARACT OF BOTH EYES: ICD-10-CM

## 2018-06-18 PROCEDURE — 92014 COMPRE OPH EXAM EST PT 1/>: CPT | Performed by: OPTOMETRIST

## 2018-06-18 NOTE — PROGRESS NOTES
Aman Marley is a 80year old male. HPI:     HPI     Diabetic Eye Exam   Diabetes characteristics include Type 2, controlled with diet and taking oral medications. Duration of 16 years. Number of years on pills 16.   Does PT check his/her own bloodsu Never Used                      Alcohol use:  Yes              Comment: sy, 1 glass, socially      Medications:    Current Outpatient Prescriptions:  METFORMIN  MG Oral Tab TAKE TWO TABLETS BY MOUTH TWICE DAILY  Disp: 360 tablet Rfl: 1   Loratadine Slit Lamp Exam       Right Left    Lids/Lashes Normal Normal    Conjunctiva/Sclera Nasal/temp pinguecula Nasal/temp pinguecula    Cornea Clear Clear    Anterior Chamber Deep and quiet Deep and quiet    Iris Normal longstanding nevus at 5 o clock    Lens 1+

## 2018-06-18 NOTE — PATIENT INSTRUCTIONS
Diabetes mellitus, stable ([facility]  I advised patient that there is no background diabetic retinopathy in either eye and that they should continue to keep their blood sugar under control and continue to see their physician as directed.  I stressed the importan

## 2018-07-09 ENCOUNTER — OFFICE VISIT (OUTPATIENT)
Dept: INTERNAL MEDICINE CLINIC | Facility: CLINIC | Age: 68
End: 2018-07-09

## 2018-07-09 VITALS
WEIGHT: 187 LBS | BODY MASS INDEX: 27.7 KG/M2 | DIASTOLIC BLOOD PRESSURE: 70 MMHG | HEIGHT: 69 IN | OXYGEN SATURATION: 99 % | HEART RATE: 76 BPM | TEMPERATURE: 99 F | SYSTOLIC BLOOD PRESSURE: 132 MMHG

## 2018-07-09 DIAGNOSIS — I82.512 CHRONIC DEEP VEIN THROMBOSIS (DVT) OF FEMORAL VEIN OF LEFT LOWER EXTREMITY (HCC): ICD-10-CM

## 2018-07-09 DIAGNOSIS — E11.8 TYPE 2 DIABETES MELLITUS WITH COMPLICATION, WITHOUT LONG-TERM CURRENT USE OF INSULIN (HCC): Primary | ICD-10-CM

## 2018-07-09 DIAGNOSIS — R10.32 LEFT LOWER QUADRANT PAIN: ICD-10-CM

## 2018-07-09 DIAGNOSIS — Z86.010 HX OF ADENOMATOUS COLONIC POLYPS: ICD-10-CM

## 2018-07-09 DIAGNOSIS — K74.69 OTHER CIRRHOSIS OF LIVER (HCC): ICD-10-CM

## 2018-07-09 LAB
ANION GAP SERPL CALC-SCNC: 8 MMOL/L (ref 0–18)
BACTERIA UR QL AUTO: NEGATIVE /HPF
BASOPHILS # BLD: 0 K/UL (ref 0–0.2)
BASOPHILS NFR BLD: 0 %
BILIRUB UR QL: NEGATIVE
BUN SERPL-MCNC: 7 MG/DL (ref 8–20)
BUN/CREAT SERPL: 9 (ref 10–20)
CALCIUM SERPL-MCNC: 8.8 MG/DL (ref 8.5–10.5)
CHLORIDE SERPL-SCNC: 105 MMOL/L (ref 95–110)
CLARITY UR: CLEAR
CO2 SERPL-SCNC: 23 MMOL/L (ref 22–32)
COLOR UR: YELLOW
CREAT SERPL-MCNC: 0.78 MG/DL (ref 0.5–1.5)
EOSINOPHIL # BLD: 0.1 K/UL (ref 0–0.7)
EOSINOPHIL NFR BLD: 2 %
ERYTHROCYTE [DISTWIDTH] IN BLOOD BY AUTOMATED COUNT: 13.4 % (ref 11–15)
GLUCOSE SERPL-MCNC: 268 MG/DL (ref 70–99)
GLUCOSE UR-MCNC: >=500 MG/DL
HCT VFR BLD AUTO: 42.6 % (ref 41–52)
HGB BLD-MCNC: 14.3 G/DL (ref 13.5–17.5)
HGB UR QL STRIP.AUTO: NEGATIVE
KETONES UR-MCNC: NEGATIVE MG/DL
LEUKOCYTE ESTERASE UR QL STRIP.AUTO: NEGATIVE
LYMPHOCYTES # BLD: 1.5 K/UL (ref 1–4)
LYMPHOCYTES NFR BLD: 24 %
MCH RBC QN AUTO: 30.2 PG (ref 27–32)
MCHC RBC AUTO-ENTMCNC: 33.5 G/DL (ref 32–37)
MCV RBC AUTO: 90 FL (ref 80–100)
MONOCYTES # BLD: 0.5 K/UL (ref 0–1)
MONOCYTES NFR BLD: 8 %
NEUTROPHILS # BLD AUTO: 4.3 K/UL (ref 1.8–7.7)
NEUTROPHILS NFR BLD: 67 %
NITRITE UR QL STRIP.AUTO: NEGATIVE
OSMOLALITY UR CALC.SUM OF ELEC: 289 MOSM/KG (ref 275–295)
PH UR: 5 [PH] (ref 5–8)
PLATELET # BLD AUTO: 122 K/UL (ref 140–400)
PMV BLD AUTO: 9 FL (ref 7.4–10.3)
POTASSIUM SERPL-SCNC: 4.2 MMOL/L (ref 3.3–5.1)
PROT UR-MCNC: NEGATIVE MG/DL
RBC # BLD AUTO: 4.74 M/UL (ref 4.5–5.9)
RBC #/AREA URNS AUTO: 1 /HPF
SODIUM SERPL-SCNC: 136 MMOL/L (ref 136–144)
SP GR UR STRIP: 1.02 (ref 1–1.03)
UROBILINOGEN UR STRIP-ACNC: <2
VIT C UR-MCNC: NEGATIVE MG/DL
WBC # BLD AUTO: 6.4 K/UL (ref 4–11)
WBC #/AREA URNS AUTO: <1 /HPF

## 2018-07-09 PROCEDURE — 81001 URINALYSIS AUTO W/SCOPE: CPT | Performed by: INTERNAL MEDICINE

## 2018-07-09 PROCEDURE — 82043 UR ALBUMIN QUANTITATIVE: CPT | Performed by: INTERNAL MEDICINE

## 2018-07-09 PROCEDURE — 85025 COMPLETE CBC W/AUTO DIFF WBC: CPT | Performed by: INTERNAL MEDICINE

## 2018-07-09 PROCEDURE — 82570 ASSAY OF URINE CREATININE: CPT | Performed by: INTERNAL MEDICINE

## 2018-07-09 PROCEDURE — 90471 IMMUNIZATION ADMIN: CPT | Performed by: INTERNAL MEDICINE

## 2018-07-09 PROCEDURE — 80048 BASIC METABOLIC PNL TOTAL CA: CPT | Performed by: INTERNAL MEDICINE

## 2018-07-09 PROCEDURE — 90746 HEPB VACCINE 3 DOSE ADULT IM: CPT | Performed by: INTERNAL MEDICINE

## 2018-07-09 PROCEDURE — 99214 OFFICE O/P EST MOD 30 MIN: CPT | Performed by: INTERNAL MEDICINE

## 2018-07-09 PROCEDURE — 36415 COLL VENOUS BLD VENIPUNCTURE: CPT | Performed by: INTERNAL MEDICINE

## 2018-07-09 PROCEDURE — 83036 HEMOGLOBIN GLYCOSYLATED A1C: CPT | Performed by: INTERNAL MEDICINE

## 2018-07-09 NOTE — PROGRESS NOTES
Pt presented to clinic today for blood draw. Per physician able to draw orders. Orders  documented within chart. Pt tolerated lab draw well.  verified.   Orders drawn include: a1c, bmp, cbc   Site of draw: left arm   Seema Weinstein, MYKE    HEP B  inject

## 2018-07-10 LAB
CREAT UR-MCNC: 51.4 MG/DL
HBA1C MFR BLD: 7.8 % (ref 4–6)
MICROALBUMIN UR-MCNC: 0 MG/DL (ref 0–1.8)
MICROALBUMIN/CREAT UR: 0 MG/G{CREAT} (ref 0–20)

## 2018-07-10 NOTE — PROGRESS NOTES
Jose Painting is a 80year old male. Patient presents with:  Diabetes: pt presents to clinic for DM amd DVT  follow up. needs pneumovax 23      HPI:          Patient not checking blood sugar regularly-no recent readings.    Bowels soft, normal color, wi Comment: Procedure: COLONOSCOPY;  Surgeon: Azael Mahoney MD;  Location: [address] [place] [address] ENDOSCOPY  No date: HAND/FINGER SURGERY UNLISTED Right      Comment: hand surgery  No date: HERNIA SURGERY  No date: UMBILICAL HERNIA REPAIR   Family History   P tenderness on palpation left lower abdomen without rebound.   No masses felt, no flank pain  Extremities-no cyanosis clubbing or edema; no heat or swelling of left lower extremity, negative Homans sign      Objectives:    Results for orders placed or perfor Cordelia Tobar MD

## 2018-07-20 RX ORDER — OMEPRAZOLE 40 MG/1
CAPSULE, DELAYED RELEASE ORAL
Qty: 30 CAPSULE | Refills: 1 | Status: SHIPPED | OUTPATIENT
Start: 2018-07-20 | End: 2018-10-19

## 2018-07-20 NOTE — TELEPHONE ENCOUNTER
SIERRA 02/14/18 with Dr. Monica Gonsalez 04/20/18 #30 with 2 refills    Please advise on refill request.    Message sent to Dr. Vito Hoyt who is covering for Dr. Bette Freed.

## 2018-07-23 ENCOUNTER — TELEPHONE (OUTPATIENT)
Dept: INTERNAL MEDICINE CLINIC | Facility: CLINIC | Age: 68
End: 2018-07-23

## 2018-07-23 NOTE — TELEPHONE ENCOUNTER
Advised patient to monitor blood sugars closely and check blood sugar daily because of recently elevated blood sugars. Discussed with wife.

## 2018-08-08 RX ORDER — GLIMEPIRIDE 2 MG/1
TABLET ORAL
Qty: 90 TABLET | Refills: 1 | Status: SHIPPED | OUTPATIENT
Start: 2018-08-08 | End: 2019-01-17

## 2018-08-09 ENCOUNTER — NURSE ONLY (OUTPATIENT)
Dept: INTERNAL MEDICINE CLINIC | Facility: CLINIC | Age: 68
End: 2018-08-09
Payer: COMMERCIAL

## 2018-08-09 DIAGNOSIS — Z23 NEED FOR HEPATITIS B VACCINATION: Primary | ICD-10-CM

## 2018-08-09 PROCEDURE — 90746 HEPB VACCINE 3 DOSE ADULT IM: CPT

## 2018-08-09 PROCEDURE — 90471 IMMUNIZATION ADMIN: CPT | Performed by: INTERNAL MEDICINE

## 2018-08-09 NOTE — PROGRESS NOTES
HEP B  injection/vaccine  1 mL administered IM  to the RT deltoid  Vaccine/injection ordered by physician and documented within chart. Per physician able to administer vaccine/injection. Pt tolerated well. VIS provided and pt had no further questions.

## 2018-09-18 ENCOUNTER — TELEPHONE (OUTPATIENT)
Dept: INTERNAL MEDICINE CLINIC | Facility: CLINIC | Age: 68
End: 2018-09-18

## 2018-10-23 RX ORDER — OMEPRAZOLE 40 MG/1
CAPSULE, DELAYED RELEASE ORAL
Qty: 30 CAPSULE | Refills: 6 | Status: SHIPPED | OUTPATIENT
Start: 2018-10-23 | End: 2019-03-11

## 2018-10-24 RX ORDER — OMEPRAZOLE 40 MG/1
CAPSULE, DELAYED RELEASE ORAL
Qty: 30 CAPSULE | Refills: 0 | OUTPATIENT
Start: 2018-10-24

## 2018-11-03 ENCOUNTER — APPOINTMENT (OUTPATIENT)
Dept: LAB | Facility: HOSPITAL | Age: 68
End: 2018-11-03
Attending: INTERNAL MEDICINE
Payer: COMMERCIAL

## 2018-11-03 DIAGNOSIS — I82.409 DEEP VEIN THROMBOSIS (DVT) (HCC): ICD-10-CM

## 2018-11-03 PROCEDURE — 85598 HEXAGNAL PHOSPH PLTLT NEUTRL: CPT

## 2018-11-03 PROCEDURE — 85610 PROTHROMBIN TIME: CPT

## 2018-11-03 PROCEDURE — 85730 THROMBOPLASTIN TIME PARTIAL: CPT

## 2018-11-03 PROCEDURE — 85390 FIBRINOLYSINS SCREEN I&R: CPT

## 2018-11-03 PROCEDURE — 85613 RUSSELL VIPER VENOM DILUTED: CPT

## 2018-11-03 PROCEDURE — 36415 COLL VENOUS BLD VENIPUNCTURE: CPT

## 2018-11-16 ENCOUNTER — OFFICE VISIT (OUTPATIENT)
Dept: HEMATOLOGY/ONCOLOGY | Facility: HOSPITAL | Age: 68
End: 2018-11-16
Attending: INTERNAL MEDICINE
Payer: COMMERCIAL

## 2018-11-16 VITALS
BODY MASS INDEX: 27.11 KG/M2 | RESPIRATION RATE: 18 BRPM | DIASTOLIC BLOOD PRESSURE: 68 MMHG | WEIGHT: 183 LBS | HEIGHT: 69 IN | HEART RATE: 68 BPM | SYSTOLIC BLOOD PRESSURE: 137 MMHG | TEMPERATURE: 99 F

## 2018-11-16 DIAGNOSIS — I82.402 ACUTE DEEP VEIN THROMBOSIS (DVT) OF LEFT LOWER EXTREMITY (HCC): ICD-10-CM

## 2018-11-16 DIAGNOSIS — R76.0 ANTIPHOSPHOLIPID ANTIBODY POSITIVE: Primary | ICD-10-CM

## 2018-11-16 PROCEDURE — 99214 OFFICE O/P EST MOD 30 MIN: CPT | Performed by: INTERNAL MEDICINE

## 2018-11-16 NOTE — PROGRESS NOTES
Hematology Progress Note    Patient Name: Ileana Yoon   YOB: 1950   Medical Record Number: [id number]   CSN: [id number]  Consulting Physician: Jennifer Morales MD  Referring Physician(s):  Nikhil Rust  Date of Visit: 11/16/2018     Ch clot.  Patient has no symptoms to suggest post phlebitis syndrome such as residual skin changes or pain in the affected limb. Interval History:  Veena Pacheco returns with his wife today after completion of 1 year anticoagulation with Xarelto.   He is already un Medical History:  Family History   Problem Relation Age of Onset   • Diabetes Father    • Cancer Mother         colon cancer,  80   • Clotting Disorder Mother         post surgery development of DVT   • Diabetes Other         aunt   • Diabetes Sister Originally from [place].       Allergies:   No Known Allergies    Current Medications:    OMEPRAZOLE 40 MG Oral Capsule Delayed Release TAKE 1 CAPSULE BY MOUTH ONCE DAILY BEFORE BREAKFAST Disp: 30 capsule Rfl: 6   GLIMEPIRIDE 2 MG Oral Tab TAKE 2 TA responses  HEENT: EOMs intact. Oropharynx is clear. Neck: No palpable lymphadenopathy. Neck is supple. Lymphatics: There is no palpable lymphadenopathy throughout in the cervical, supraclavicular, axillary, or inguinal regions.   Chest: Clear to ausculta pertinent family history & lack of provoking symptoms, we recommended patient undergo hypercoagulable lab testing  --Pt has a MILDLY lowered protein C level, would not alter management based on this results    --Recommended checking antiphospholipid antibo treatment was explained to the patient and the family. Elizabeth Parra MD  [facility]  [address], [facility]

## 2018-12-17 ENCOUNTER — HOSPITAL ENCOUNTER (OUTPATIENT)
Dept: MRI IMAGING | Facility: HOSPITAL | Age: 68
Discharge: HOME | End: 2018-12-17
Attending: NURSE PRACTITIONER
Payer: COMMERCIAL

## 2018-12-17 DIAGNOSIS — K74.60 CIRRHOSIS OF LIVER WITHOUT ASCITES, UNSPECIFIED HEPATIC CIRRHOSIS TYPE (HCC): ICD-10-CM

## 2018-12-17 PROCEDURE — 74183 MRI ABD W/O CNTR FLWD CNTR: CPT | Performed by: NURSE PRACTITIONER

## 2018-12-17 PROCEDURE — 82565 ASSAY OF CREATININE: CPT

## 2018-12-17 PROCEDURE — A9575 INJ GADOTERATE MEGLUMI 0.1ML: HCPCS | Performed by: RADIOLOGY

## 2019-01-09 ENCOUNTER — OFFICE VISIT (OUTPATIENT)
Dept: INTERNAL MEDICINE CLINIC | Facility: CLINIC | Age: 69
End: 2019-01-09
Payer: COMMERCIAL

## 2019-01-09 VITALS
TEMPERATURE: 99 F | RESPIRATION RATE: 17 BRPM | BODY MASS INDEX: 27 KG/M2 | SYSTOLIC BLOOD PRESSURE: 136 MMHG | WEIGHT: 182 LBS | DIASTOLIC BLOOD PRESSURE: 72 MMHG | OXYGEN SATURATION: 98 % | HEART RATE: 76 BPM

## 2019-01-09 DIAGNOSIS — R10.32 LEFT LOWER QUADRANT PAIN: ICD-10-CM

## 2019-01-09 DIAGNOSIS — K74.69 OTHER CIRRHOSIS OF LIVER (HCC): ICD-10-CM

## 2019-01-09 DIAGNOSIS — I82.512 CHRONIC DEEP VEIN THROMBOSIS (DVT) OF FEMORAL VEIN OF LEFT LOWER EXTREMITY (HCC): ICD-10-CM

## 2019-01-09 DIAGNOSIS — Z23 NEED FOR HEPATITIS B VACCINATION: ICD-10-CM

## 2019-01-09 DIAGNOSIS — E11.8 TYPE 2 DIABETES MELLITUS WITH COMPLICATION, WITHOUT LONG-TERM CURRENT USE OF INSULIN (HCC): Primary | ICD-10-CM

## 2019-01-09 DIAGNOSIS — Z12.5 SCREENING FOR PROSTATE CANCER: ICD-10-CM

## 2019-01-09 DIAGNOSIS — R79.9 ABNORMAL BLOOD CHEMISTRY TEST: ICD-10-CM

## 2019-01-09 PROCEDURE — 90746 HEPB VACCINE 3 DOSE ADULT IM: CPT | Performed by: INTERNAL MEDICINE

## 2019-01-09 PROCEDURE — 99214 OFFICE O/P EST MOD 30 MIN: CPT | Performed by: INTERNAL MEDICINE

## 2019-01-09 PROCEDURE — 90471 IMMUNIZATION ADMIN: CPT | Performed by: INTERNAL MEDICINE

## 2019-01-09 NOTE — PROGRESS NOTES
HEP B injection/vaccine  1 mL administered IM  to the rt deltoid . Vaccine/injection ordered by physician and documented within chart. Per physician able to administer vaccine/injection. Pt tolerated well. VIS provided and pt had no further questions.

## 2019-01-09 NOTE — PROGRESS NOTES
Aman Marley is a 80year old male.   Patient presents with:  Diabetes: pt presents to clinic for Diabetes follow up + 3rd Hep B vaccine       HPI:         Not checking BS-knows that has been eating poorly since Thanksgiving  To see Dr Jared Zazueta end month Surgical History:   Procedure Laterality Date   • COLONOSCOPY N/A 4/20/2018    Performed by Pito Woods MD at [facility] ENDOSCOPY   • ESOPHAGOGASTRODUODENOSCOPY (EGD) N/A 4/20/2018    Performed by Pito Woods MD at [facility] ENDOSCOPY   • HAND/FINGER SURGE Thyroid normal.  Lungs-clear to auscultation   Heart-S1-S2 normal, no S3 or murmur. Rhythm regular. Abdomen-bowel sounds normal, no organomegaly, no masses.   He has tenderness to palpation left mid to lower abdomen, though states palpation does not incre hypertrophy  of the caudate and left lateral hepatic segments, consistent with cirrhotic morphology.  Extensive T2 hyperintense branching reticular bands are seen throughout the hepatic parenchyma and demonstrate delayed enhancement and capsular retraction, by scan protocol, but without grossly apparent bony lesion or fracture. Mild scoliosis is noted. Reciprocal endplate signal abnormalities are likely degenerative in nature. OTHER: No loculated fluid collections are appreciated. CONCLUSION:  1.  Hepat months, with no recurrence and swelling, erythema or pain    (Z12.5) Screening for prostate cancer  Plan: Recheck PSA. History prostatomegaly.   Check prostate exam future    (R79.9) Abnormal blood chemistry test  Plan: Positive lupus anticoagulant, likely

## 2019-01-17 ENCOUNTER — OFFICE VISIT (OUTPATIENT)
Dept: GASTROENTEROLOGY | Facility: CLINIC | Age: 69
End: 2019-01-17
Payer: COMMERCIAL

## 2019-01-17 VITALS
WEIGHT: 185 LBS | HEIGHT: 68 IN | DIASTOLIC BLOOD PRESSURE: 77 MMHG | HEART RATE: 73 BPM | SYSTOLIC BLOOD PRESSURE: 146 MMHG | BODY MASS INDEX: 28.04 KG/M2

## 2019-01-17 DIAGNOSIS — Z86.010 HISTORY OF COLON POLYPS: ICD-10-CM

## 2019-01-17 DIAGNOSIS — K74.60 CIRRHOSIS OF LIVER WITHOUT ASCITES, UNSPECIFIED HEPATIC CIRRHOSIS TYPE (HCC): Primary | ICD-10-CM

## 2019-01-17 PROCEDURE — 99213 OFFICE O/P EST LOW 20 MIN: CPT | Performed by: INTERNAL MEDICINE

## 2019-01-17 PROCEDURE — 99212 OFFICE O/P EST SF 10 MIN: CPT | Performed by: INTERNAL MEDICINE

## 2019-01-17 RX ORDER — MAGNESIUM OXIDE 400 MG (241.3 MG MAGNESIUM) TABLET
400 TABLET EVERY OTHER DAY
COMMUNITY

## 2019-01-17 NOTE — PATIENT INSTRUCTIONS
Colon polyp near ileocecal valve  - will check on removal with colonoscopy    EGD for screening for varices and follow up on esophagus    Follow up with Dr. Mya Botello as directed for monitoring the liver lesions

## 2019-01-18 ENCOUNTER — TELEPHONE (OUTPATIENT)
Dept: GASTROENTEROLOGY | Facility: CLINIC | Age: 69
End: 2019-01-18

## 2019-01-18 DIAGNOSIS — Z86.010 HISTORY OF ADENOMATOUS POLYP OF COLON: Primary | ICD-10-CM

## 2019-01-18 NOTE — TELEPHONE ENCOUNTER
Discussed pts prior colonoscopy and need for repeat colonoscopy with Dr. Jayde Sharpe. He is agreeable to attempting removal of polyp.       RN to set up with Dr. Jayde Sharpe, colonoscopy for polyp   colyte prep   ( pt now off his xaralto)  MAC sedation

## 2019-01-20 RX ORDER — GLIMEPIRIDE 2 MG/1
TABLET ORAL
Qty: 90 TABLET | Refills: 0 | Status: SHIPPED | OUTPATIENT
Start: 2019-01-20 | End: 2019-03-08

## 2019-01-23 NOTE — PROGRESS NOTES
Salbador Gosselin is a 80year old male. HPI:   Patient presents with:   Follow - Up      The patient is a 64-year-old male with history of cirrhosis and liver lesions who is followed by hematology/Dr. Tracey Villaseñor- he has follow up in 1 week to discuss the liver unknown etiology- gender specific   • Clotting Disorder Maternal Grandfather          of DVT in legs   • Glaucoma Neg    • Bleeding Disorders Neg    • Anemia Neg       Social History: Social History    Tobacco Use      Smoking status: Never Smoker male who follows up in the office today. We discussed cirrhosis and the need for continued monitoring of his liver lesions. Concern for possible neoplastic change/liver cancer is at increased risk in cirrhotic patients.   Patient undergo ongoing monitorin

## 2019-01-23 NOTE — TELEPHONE ENCOUNTER
Dr. Katja Yadav--    Please advise on orders. Is this going to be with an EUS or just a regular colonoscopy and will you need special equipment?     Thank you

## 2019-01-23 NOTE — TELEPHONE ENCOUNTER
Colonoscopy only. Diagnosis history of adenomatous colon polyps. Colonoscopy with MAC at the hospital. Schedule for 1 hour. Can even schedule this Monday afternoon. A patient I was holding this slot for is no longer needed.     Hold metformin, glimepiride d

## 2019-01-28 ENCOUNTER — OFFICE VISIT (OUTPATIENT)
Dept: SURGERY | Facility: CLINIC | Age: 69
End: 2019-01-28
Payer: COMMERCIAL

## 2019-01-28 VITALS
WEIGHT: 186 LBS | OXYGEN SATURATION: 95 % | BODY MASS INDEX: 28 KG/M2 | HEART RATE: 78 BPM | RESPIRATION RATE: 16 BRPM | DIASTOLIC BLOOD PRESSURE: 80 MMHG | SYSTOLIC BLOOD PRESSURE: 156 MMHG

## 2019-01-28 DIAGNOSIS — K74.69 CRYPTOGENIC CIRRHOSIS (HCC): Primary | ICD-10-CM

## 2019-01-28 NOTE — PROGRESS NOTES
[facility]  [address]  [address]  [phone] ([phone] 24 HOUR OR), Take by mouth.  , Disp: , Rfl:   •  Azelastine-Fluticasone (DYMISTA) 137-50 MCG/ACT Nasal Suspension, 1 spray by Nasal route daily. , Disp: 1 Bottle, Rfl: 0  •  Glucose Blood (ONETOUCH ULTRA BLUE) In Vitro Strip, Test blood sugar daily Dx E11.9

## 2019-02-01 DIAGNOSIS — E11.49 TYPE II DIABETES MELLITUS WITH NEUROLOGICAL MANIFESTATIONS (HCC): Primary | ICD-10-CM

## 2019-02-14 NOTE — TELEPHONE ENCOUNTER
This procedure was scheduled by his wife (on MACY)    Scheduled for:  Colonoscopy [address]  Provider Name: Dr. Chano Nagy  Date:  4/16/19  Location:  [facility]  Sedation:  MAC  Time:   0702 (pt is aware to arrive at 1245)   Prep:  Colyte, sent via [employer]

## 2019-03-09 ENCOUNTER — LAB ENCOUNTER (OUTPATIENT)
Dept: LAB | Facility: HOSPITAL | Age: 69
End: 2019-03-09
Attending: INTERNAL MEDICINE
Payer: COMMERCIAL

## 2019-03-09 DIAGNOSIS — E11.49 TYPE II DIABETES MELLITUS WITH NEUROLOGICAL MANIFESTATIONS (HCC): ICD-10-CM

## 2019-03-09 DIAGNOSIS — K74.69 CRYPTOGENIC CIRRHOSIS (HCC): ICD-10-CM

## 2019-03-09 LAB
AFP-TM SERPL-MCNC: 3.8 NG/ML (ref ?–8)
ALBUMIN SERPL-MCNC: 3.6 G/DL (ref 3.4–5)
ALBUMIN/GLOB SERPL: 0.8 {RATIO} (ref 1–2)
ALP LIVER SERPL-CCNC: 76 U/L (ref 45–117)
ALT SERPL-CCNC: 65 U/L (ref 16–61)
ANION GAP SERPL CALC-SCNC: 7 MMOL/L (ref 0–18)
AST SERPL-CCNC: 56 U/L (ref 15–37)
BASOPHILS # BLD AUTO: 0.03 X10(3) UL (ref 0–0.2)
BASOPHILS NFR BLD AUTO: 0.4 %
BILIRUB SERPL-MCNC: 1.5 MG/DL (ref 0.1–2)
BUN BLD-MCNC: 13 MG/DL (ref 7–18)
BUN/CREAT SERPL: 16.7 (ref 10–20)
CALCIUM BLD-MCNC: 8.8 MG/DL (ref 8.5–10.1)
CHLORIDE SERPL-SCNC: 102 MMOL/L (ref 98–107)
CHOLEST SMN-MCNC: 119 MG/DL (ref ?–200)
CO2 SERPL-SCNC: 28 MMOL/L (ref 21–32)
CREAT BLD-MCNC: 0.78 MG/DL (ref 0.7–1.3)
CREAT UR-SCNC: 169 MG/DL
DEPRECATED RDW RBC AUTO: 41.6 FL (ref 35.1–46.3)
EOSINOPHIL # BLD AUTO: 0.11 X10(3) UL (ref 0–0.7)
EOSINOPHIL NFR BLD AUTO: 1.6 %
ERYTHROCYTE [DISTWIDTH] IN BLOOD BY AUTOMATED COUNT: 12.5 % (ref 11–15)
EST. AVERAGE GLUCOSE BLD GHB EST-MCNC: 240 MG/DL (ref 68–126)
GLOBULIN PLAS-MCNC: 4.3 G/DL (ref 2.8–4.4)
GLUCOSE BLD-MCNC: 122 MG/DL (ref 70–99)
HBA1C MFR BLD HPLC: 10 % (ref ?–5.7)
HCT VFR BLD AUTO: 46.9 % (ref 39–53)
HDLC SERPL-MCNC: 44 MG/DL (ref 40–59)
HGB BLD-MCNC: 15.3 G/DL (ref 13–17.5)
IMM GRANULOCYTES # BLD AUTO: 0.02 X10(3) UL (ref 0–1)
IMM GRANULOCYTES NFR BLD: 0.3 %
INR BLD: 1.16 (ref 0.9–1.2)
LDLC SERPL CALC-MCNC: 50 MG/DL (ref ?–100)
LYMPHOCYTES # BLD AUTO: 1.72 X10(3) UL (ref 1–4)
LYMPHOCYTES NFR BLD AUTO: 24.6 %
M PROTEIN MFR SERPL ELPH: 7.9 G/DL (ref 6.4–8.2)
MCH RBC QN AUTO: 29.8 PG (ref 26–34)
MCHC RBC AUTO-ENTMCNC: 32.6 G/DL (ref 31–37)
MCV RBC AUTO: 91.2 FL (ref 80–100)
MICROALBUMIN UR-MCNC: 0.9 MG/DL
MICROALBUMIN/CREAT 24H UR-RTO: 5.3 UG/MG (ref ?–30)
MONOCYTES # BLD AUTO: 0.52 X10(3) UL (ref 0.1–1)
MONOCYTES NFR BLD AUTO: 7.4 %
NEUTROPHILS # BLD AUTO: 4.58 X10 (3) UL (ref 1.5–7.7)
NEUTROPHILS # BLD AUTO: 4.58 X10(3) UL (ref 1.5–7.7)
NEUTROPHILS NFR BLD AUTO: 65.7 %
NONHDLC SERPL-MCNC: 75 MG/DL (ref ?–130)
OSMOLALITY SERPL CALC.SUM OF ELEC: 285 MOSM/KG (ref 275–295)
PLATELET # BLD AUTO: 161 10(3)UL (ref 150–450)
POTASSIUM SERPL-SCNC: 4.2 MMOL/L (ref 3.5–5.1)
PROTHROMBIN TIME: 14.7 SECONDS (ref 11.8–14.5)
RBC # BLD AUTO: 5.14 X10(6)UL (ref 3.8–5.8)
SODIUM SERPL-SCNC: 137 MMOL/L (ref 136–145)
TRIGL SERPL-MCNC: 126 MG/DL (ref 30–149)
VLDLC SERPL CALC-MCNC: 25 MG/DL (ref 0–30)
WBC # BLD AUTO: 7 X10(3) UL (ref 4–11)

## 2019-03-09 PROCEDURE — 85610 PROTHROMBIN TIME: CPT

## 2019-03-09 PROCEDURE — 85025 COMPLETE CBC W/AUTO DIFF WBC: CPT

## 2019-03-09 PROCEDURE — 80053 COMPREHEN METABOLIC PANEL: CPT

## 2019-03-09 PROCEDURE — 80061 LIPID PANEL: CPT

## 2019-03-09 PROCEDURE — 82570 ASSAY OF URINE CREATININE: CPT

## 2019-03-09 PROCEDURE — 83036 HEMOGLOBIN GLYCOSYLATED A1C: CPT

## 2019-03-09 PROCEDURE — 82043 UR ALBUMIN QUANTITATIVE: CPT

## 2019-03-09 PROCEDURE — 82105 ALPHA-FETOPROTEIN SERUM: CPT

## 2019-03-09 PROCEDURE — 36415 COLL VENOUS BLD VENIPUNCTURE: CPT

## 2019-03-09 NOTE — TELEPHONE ENCOUNTER
Diabetic Medication Protocol Failed3/8 5:48 PM   HgBA1C procedure resulted in past 6 months    Last HgBA1C < 7.5    Microalbumin procedure in past 12 months or taking ACE/ARB    Appointment in past 6 or next 3 months     Last OV 01/09/19  Future appt  03/1

## 2019-03-10 RX ORDER — GLIMEPIRIDE 2 MG/1
TABLET ORAL
Qty: 90 TABLET | Refills: 0 | Status: SHIPPED | OUTPATIENT
Start: 2019-03-10 | End: 2019-03-11

## 2019-03-11 ENCOUNTER — OFFICE VISIT (OUTPATIENT)
Dept: INTERNAL MEDICINE CLINIC | Facility: CLINIC | Age: 69
End: 2019-03-11
Payer: COMMERCIAL

## 2019-03-11 VITALS
BODY MASS INDEX: 27.85 KG/M2 | HEART RATE: 70 BPM | HEIGHT: 69 IN | SYSTOLIC BLOOD PRESSURE: 130 MMHG | TEMPERATURE: 98 F | WEIGHT: 188 LBS | RESPIRATION RATE: 18 BRPM | DIASTOLIC BLOOD PRESSURE: 62 MMHG | OXYGEN SATURATION: 98 %

## 2019-03-11 DIAGNOSIS — D68.61 ANTIPHOSPHOLIPID ANTIBODY WITH HYPERCOAGULABLE STATE (HCC): ICD-10-CM

## 2019-03-11 DIAGNOSIS — R79.89 ABNORMAL LIVER FUNCTION TESTS: ICD-10-CM

## 2019-03-11 DIAGNOSIS — E04.1 RIGHT THYROID NODULE: ICD-10-CM

## 2019-03-11 DIAGNOSIS — I82.5Z2 CHRONIC DEEP VEIN THROMBOSIS (DVT) OF DISTAL VEIN OF LEFT LOWER EXTREMITY (HCC): ICD-10-CM

## 2019-03-11 DIAGNOSIS — Z86.010 HX OF ADENOMATOUS COLONIC POLYPS: ICD-10-CM

## 2019-03-11 DIAGNOSIS — Z12.5 SCREENING FOR PROSTATE CANCER: ICD-10-CM

## 2019-03-11 DIAGNOSIS — D68.59 PROTEIN C DEFICIENCY (HCC): ICD-10-CM

## 2019-03-11 DIAGNOSIS — I82.512 CHRONIC DEEP VEIN THROMBOSIS (DVT) OF FEMORAL VEIN OF LEFT LOWER EXTREMITY (HCC): ICD-10-CM

## 2019-03-11 DIAGNOSIS — I70.0 ATHEROSCLEROSIS OF ABDOMINAL AORTA (HCC): ICD-10-CM

## 2019-03-11 DIAGNOSIS — E11.65 UNCONTROLLED TYPE 2 DIABETES MELLITUS WITH HYPERGLYCEMIA (HCC): Primary | ICD-10-CM

## 2019-03-11 DIAGNOSIS — K74.69 OTHER CIRRHOSIS OF LIVER (HCC): ICD-10-CM

## 2019-03-11 PROBLEM — D69.9 BLEEDING DISORDER: Status: RESOLVED | Noted: 2017-01-01 | Resolved: 2019-03-11

## 2019-03-11 PROBLEM — I82.402 ACUTE DEEP VEIN THROMBOSIS (DVT) OF LEFT LOWER EXTREMITY (HCC): Status: RESOLVED | Noted: 2017-05-04 | Resolved: 2019-03-11

## 2019-03-11 PROBLEM — D69.9 BLEEDING DISORDER (HCC): Status: RESOLVED | Noted: 2017-01-01 | Resolved: 2019-03-11

## 2019-03-11 PROCEDURE — 99214 OFFICE O/P EST MOD 30 MIN: CPT | Performed by: INTERNAL MEDICINE

## 2019-03-11 RX ORDER — METFORMIN HYDROCHLORIDE 500 MG/1
1000 TABLET, EXTENDED RELEASE ORAL 2 TIMES DAILY WITH MEALS
Qty: 360 TABLET | Refills: 0 | Status: SHIPPED | OUTPATIENT
Start: 2019-03-11 | End: 2019-06-09

## 2019-03-11 RX ORDER — GLIMEPIRIDE 2 MG/1
TABLET ORAL
Qty: 270 TABLET | Refills: 0 | Status: SHIPPED | OUTPATIENT
Start: 2019-03-11 | End: 2019-06-09

## 2019-03-11 RX ORDER — OMEPRAZOLE 40 MG/1
40 CAPSULE, DELAYED RELEASE ORAL DAILY
Qty: 90 CAPSULE | Refills: 0 | Status: SHIPPED | OUTPATIENT
Start: 2019-03-11 | End: 2019-06-11

## 2019-03-11 NOTE — PROGRESS NOTES
Harvey Superior is a 80year old male. Patient presents with:  Diabetes: pt presents to clinic for Diabetes follow up       HPI:         Since last visit, Metfrormin ER switched to Metformin.      Not as active with cold weather, and patient admits to hav Palmira Felder MD at [address] [place] [address] ENDOSCOPY   • ESOPHAGOGASTRODUODENOSCOPY (EGD) N/A 4/20/2018    Performed by Palmira Felder MD at [address] [place] [address] ENDOSCOPY   • HAND/FINGER SURGERY UNLISTED Right     hand surgery   • HERNIA SURGERY     • UMBILICAL HERNIA REPAIR        F Rhythm regular. Abdomen-bowel sounds normal, no organomegaly, no tenderness to palpation. No masses. Sl protuberant  Extremities-no edema.   Left leg slightly larger than right lower leg, though no erythema or heat bilaterally    Objectives:  Results for MCV 91.2 80.0 - 100.0 fL    MCH 29.8 26.0 - 34.0 pg    MCHC 32.6 31.0 - 37.0 g/dL    RDW-SD 41.6 35.1 - 46.3 fL    RDW 12.5 11.0 - 15.0 %    .0 150.0 - 450.0 10(3)uL    Neutrophil Absolute Prelim 4.58 1.50 - 7.70 x10 (3) uL    Neutrophil Absolute 4. thrombosis (DVT) of femoral vein of left lower extremity (HCC)  Plan: Protein C deficiency, anti-phospholipid antibody positive.   To discuss other anticoagulation with hematology follow-up with GI    (Z86.010) Hx of adenomatous colonic polyps  Plan: April

## 2019-04-03 ENCOUNTER — TELEPHONE (OUTPATIENT)
Dept: GASTROENTEROLOGY | Facility: CLINIC | Age: 69
End: 2019-04-03

## 2019-04-03 NOTE — TELEPHONE ENCOUNTER
Pts wife Lore Riley states that pt has colonoscopy scheduled for 4/16/19 and they have not received prep instructions yet and also RX was not received at pharmacy for prep. She also has questions about insurance coverage. Please call.

## 2019-04-03 NOTE — TELEPHONE ENCOUNTER
From 01/18/19 telephone encounter message from Dr. Fariha Engel-  Discussed pts prior colonoscopy and need for repeat colonoscopy with Dr. Mark Dunham.    He is agreeable to attempting removal of polyp.       RN to set up with Dr. Mark Dunham, colonoscopy for polyp

## 2019-04-06 ENCOUNTER — HOSPITAL ENCOUNTER (OUTPATIENT)
Dept: ULTRASOUND IMAGING | Age: 69
Discharge: HOME | End: 2019-04-06
Attending: INTERNAL MEDICINE
Payer: COMMERCIAL

## 2019-04-06 DIAGNOSIS — E04.1 RIGHT THYROID NODULE: ICD-10-CM

## 2019-04-06 PROCEDURE — 76536 US EXAM OF HEAD AND NECK: CPT | Performed by: INTERNAL MEDICINE

## 2019-04-09 ENCOUNTER — TELEPHONE (OUTPATIENT)
Dept: GASTROENTEROLOGY | Facility: CLINIC | Age: 69
End: 2019-04-09

## 2019-04-09 NOTE — TELEPHONE ENCOUNTER
Noted, thank you Juan Rogers. Spoke to [place] at [address] [place] [address] and states the RX went through for $8 and will fill for the pt.  Spoke to pt wife, Frank Sandra (OK per AMCY) and informed of approval and to f/u with pharmacy for  time and cost.

## 2019-04-09 NOTE — TELEPHONE ENCOUNTER
Medication PA Requested:  Colyte Bowel Prep                                                    Pt insurance/number to contact: [employer]  CoverMyMeds Used:  Yes  Key: [employer] ID# and group: [id number]  Dx.: Hx of polyps/Screening  Medication

## 2019-04-09 NOTE — TELEPHONE ENCOUNTER
Wife states PA is needed for prep kit.         Current Outpatient Medications:     •  PEG 3350-KCl-NaBcb-NaCl-NaSulf (COLYTE WITH FLAVOR PACKS) 240 g Oral Recon Soln, Take as directed by office, Disp: 1 Bottle, Rfl: 0

## 2019-04-16 ENCOUNTER — HOSPITAL ENCOUNTER (OUTPATIENT)
Facility: HOSPITAL | Age: 69
Setting detail: HOSPITAL OUTPATIENT SURGERY
Discharge: HOME | End: 2019-04-16
Attending: INTERNAL MEDICINE | Admitting: INTERNAL MEDICINE
Payer: COMMERCIAL

## 2019-04-16 ENCOUNTER — ANESTHESIA (OUTPATIENT)
Dept: ENDOSCOPY | Facility: HOSPITAL | Age: 69
End: 2019-04-16
Payer: COMMERCIAL

## 2019-04-16 ENCOUNTER — ANESTHESIA EVENT (OUTPATIENT)
Dept: ENDOSCOPY | Facility: HOSPITAL | Age: 69
End: 2019-04-16
Payer: COMMERCIAL

## 2019-04-16 VITALS
HEIGHT: 69 IN | DIASTOLIC BLOOD PRESSURE: 79 MMHG | WEIGHT: 188 LBS | OXYGEN SATURATION: 95 % | RESPIRATION RATE: 18 BRPM | TEMPERATURE: 97 F | HEART RATE: 57 BPM | BODY MASS INDEX: 27.85 KG/M2 | SYSTOLIC BLOOD PRESSURE: 120 MMHG

## 2019-04-16 DIAGNOSIS — Z86.010 HISTORY OF ADENOMATOUS POLYP OF COLON: ICD-10-CM

## 2019-04-16 PROCEDURE — 0DBH8ZX EXCISION OF CECUM, VIA NATURAL OR ARTIFICIAL OPENING ENDOSCOPIC, DIAGNOSTIC: ICD-10-PCS | Performed by: INTERNAL MEDICINE

## 2019-04-16 PROCEDURE — 45390 COLONOSCOPY W/RESECTION: CPT | Performed by: INTERNAL MEDICINE

## 2019-04-16 RX ORDER — SODIUM CHLORIDE, SODIUM LACTATE, POTASSIUM CHLORIDE, CALCIUM CHLORIDE 600; 310; 30; 20 MG/100ML; MG/100ML; MG/100ML; MG/100ML
INJECTION, SOLUTION INTRAVENOUS CONTINUOUS
Status: DISCONTINUED | OUTPATIENT
Start: 2019-04-16 | End: 2019-04-16

## 2019-04-16 RX ORDER — DEXTROSE MONOHYDRATE 25 G/50ML
50 INJECTION, SOLUTION INTRAVENOUS
Status: DISCONTINUED | OUTPATIENT
Start: 2019-04-16 | End: 2019-04-16

## 2019-04-16 RX ORDER — NALOXONE HYDROCHLORIDE 0.4 MG/ML
80 INJECTION, SOLUTION INTRAMUSCULAR; INTRAVENOUS; SUBCUTANEOUS AS NEEDED
Status: DISCONTINUED | OUTPATIENT
Start: 2019-04-16 | End: 2019-04-16

## 2019-04-16 RX ORDER — LIDOCAINE HYDROCHLORIDE 10 MG/ML
INJECTION, SOLUTION EPIDURAL; INFILTRATION; INTRACAUDAL; PERINEURAL AS NEEDED
Status: DISCONTINUED | OUTPATIENT
Start: 2019-04-16 | End: 2019-04-16 | Stop reason: SURG

## 2019-04-16 RX ADMIN — SODIUM CHLORIDE, SODIUM LACTATE, POTASSIUM CHLORIDE, CALCIUM CHLORIDE: 600; 310; 30; 20 INJECTION, SOLUTION INTRAVENOUS at 14:32:00

## 2019-04-16 RX ADMIN — LIDOCAINE HYDROCHLORIDE 50 MG: 10 INJECTION, SOLUTION EPIDURAL; INFILTRATION; INTRACAUDAL; PERINEURAL at 13:55:00

## 2019-04-16 RX ADMIN — SODIUM CHLORIDE, SODIUM LACTATE, POTASSIUM CHLORIDE, CALCIUM CHLORIDE: 600; 310; 30; 20 INJECTION, SOLUTION INTRAVENOUS at 13:52:00

## 2019-04-16 NOTE — H&P
History & Physical Examination    Patient Name: Debbie Leahy  MRN: [id number]  [facility]: [id number]  YOB: 1950    Diagnosis: history of adenomatous colon polyps      Medications Prior to Admission:  METFORMIN  MG Oral Tab TAKE TWO TABLET Laterality Date   • COLONOSCOPY N/A 4/20/2018    Performed by Jackelin Madison MD at [facility] ENDOSCOPY   • ESOPHAGOGASTRODUODENOSCOPY (EGD) N/A 4/20/2018    Performed by Jackelin Madison MD at [facility] ENDOSCOPY   • HAND/FINGER SURGERY UNLISTED Right     hand tyler indicated. I have reviewed the History and Physical done within the last 30 days.   Any changes noted above    Kimberly Foster MD  [facility], [facility] - Gastroenterology  4/16/2019

## 2019-04-16 NOTE — OPERATIVE REPORT
Colonoscopy Report    Robert Cornelius     1950 Age 80year old   PCP Jhon eHnao MD Endoscopist Baltazar Davis MD     Date of procedure: 19    Procedure: Colonoscopy w/ endoscopic mucosal resection    Pre-operative diagnosis: juan jose remained stable. Estimated blood loss: insignificant    Specimens collected:  Colon polyp    Complications: none     Colonoscopy findings:     At the junction of the ileocecal valve/cecum was a 2 cm flat polyp which was previously biopsied and identified

## 2019-04-16 NOTE — ANESTHESIA PREPROCEDURE EVALUATION
Anesthesia PreOp Note    HPI:     Breana Molina is a 80year old male who presents for preoperative consultation requested by: Yaneli Tenorio MD    Date of Surgery: 4/16/2019    Procedure(s):  COLONOSCOPY  Indication: History of adenomatous polyp of ([facility] 04/2017   • Esophageal reflux    • Hearing loss    • Herniated intervertebral disc of lumbar spine    • Measles    • Mumps    • Pneumonia 12-25-14   • Sinus problem        Past Surgical History:   Procedure Laterality Date   • COLONOSCOPY N/A 4/20/20 aunt   • Diabetes Sister    • Diabetes Other         uncle   • Cancer Maternal Grandmother         unknown etiology- gender specific   • Clotting Disorder Maternal Grandfather          of DVT in legs   • Glaucoma Neg    • Bleeding Disorders Neg Back Care: Not Asked        Exercise: Not Asked        Bike Helmet: Not Asked        Seat Belt: Not Asked        Self-Exams: Not Asked    Social History Narrative      Zelalem Dobbs is  to spouse Lore Riley x 37 yrs. Father of 2 adult children.  He is semi-r to the best of my ability. The patient desires the anesthetic management as planned.   Jade Godoy  4/16/2019 12:49 PM

## 2019-04-16 NOTE — ANESTHESIA POSTPROCEDURE EVALUATION
Patient: Swetha Morris    Procedure Summary     Date:  04/16/19 Room / Location:  [address] [place] [address] ENDOSCOPY 01 / [address] [place] [address] ENDOSCOPY    Anesthesia Start:  6123 Anesthesia Stop:  1174    Procedure:  COLONOSCOPY (N/A ) Diagnosis:       History of adenomatous polyp of colon

## 2019-04-17 ENCOUNTER — TELEPHONE (OUTPATIENT)
Dept: GASTROENTEROLOGY | Facility: CLINIC | Age: 69
End: 2019-04-17

## 2019-04-17 NOTE — TELEPHONE ENCOUNTER
Called phone number in [facility] and spoke with patient's wife and she says he is doing well post EMR yesterday no pain eating well.     Discussed path showed a tubular adenoma which is pre-cancerous though benign and similar to polyps he had removed with Dr. Regi Ferreira

## 2019-04-18 NOTE — TELEPHONE ENCOUNTER
PATIENT OF DR. SALAS    Entered into Epic:Recall colon in 6 months per Dr. Fabio Prado. Last Colon done 4/16/19, next due 10/16/2019. Snapshot updated.

## 2019-05-06 ENCOUNTER — OFFICE VISIT (OUTPATIENT)
Dept: INTERNAL MEDICINE CLINIC | Facility: CLINIC | Age: 69
End: 2019-05-06
Payer: COMMERCIAL

## 2019-05-06 VITALS
DIASTOLIC BLOOD PRESSURE: 68 MMHG | BODY MASS INDEX: 27.55 KG/M2 | TEMPERATURE: 99 F | OXYGEN SATURATION: 98 % | HEART RATE: 76 BPM | RESPIRATION RATE: 18 BRPM | SYSTOLIC BLOOD PRESSURE: 126 MMHG | HEIGHT: 69 IN | WEIGHT: 186 LBS

## 2019-05-06 DIAGNOSIS — I82.512 CHRONIC DEEP VEIN THROMBOSIS (DVT) OF FEMORAL VEIN OF LEFT LOWER EXTREMITY (HCC): ICD-10-CM

## 2019-05-06 DIAGNOSIS — E04.2 MULTIPLE THYROID NODULES: ICD-10-CM

## 2019-05-06 DIAGNOSIS — I82.5Z2 CHRONIC DEEP VEIN THROMBOSIS (DVT) OF DISTAL VEIN OF LEFT LOWER EXTREMITY (HCC): ICD-10-CM

## 2019-05-06 DIAGNOSIS — Z86.010 HX OF ADENOMATOUS COLONIC POLYPS: ICD-10-CM

## 2019-05-06 DIAGNOSIS — E11.65 UNCONTROLLED TYPE 2 DIABETES MELLITUS WITH HYPERGLYCEMIA (HCC): Primary | ICD-10-CM

## 2019-05-06 DIAGNOSIS — K74.69 OTHER CIRRHOSIS OF LIVER (HCC): ICD-10-CM

## 2019-05-06 DIAGNOSIS — D68.61 ANTIPHOSPHOLIPID ANTIBODY WITH HYPERCOAGULABLE STATE (HCC): ICD-10-CM

## 2019-05-06 PROCEDURE — 99214 OFFICE O/P EST MOD 30 MIN: CPT | Performed by: INTERNAL MEDICINE

## 2019-05-06 NOTE — PROGRESS NOTES
Swetha Morris is a 80year old male. Patient presents with:   Follow - Up: pt presents to Clinic for follow up on USN head/neck results       HPI:       Patient had colonoscopy with polypectomy tubular adenoma per Dr. Zee Saravia, without any postop pain Samuel Rebolledo MD at [facility] ENDOSCOPY   • COLONOSCOPY N/A 4/20/2018    Performed by Xiao Harris MD at [facility] ENDOSCOPY   • ESOPHAGOGASTRODUODENOSCOPY (EGD) N/A 4/20/2018    Performed by Xaio Harris MD at [facility] ENDOSCOPY   • HAND/FINGER SURGERY UNLISTED Right regular. Abdomen-bowel sounds normal, no organomegaly. Abdomen soft, with only left lateral tenderness to palpation which patient has had for greater than 1 year. No masses.   Extremities-no significant edema    Objectives:  Results for orders placed or Advised importance of strict diabetes control, avoidance of severe hyperglycemia especially with hepatic steatosis.   Asked patient to record blood sugar readings in the morning and also prior to supper, bring to next visit in 1 month    (D68.61) Antiphosph

## 2019-06-10 RX ORDER — METFORMIN HYDROCHLORIDE 500 MG/1
TABLET, EXTENDED RELEASE ORAL
Qty: 360 TABLET | Refills: 0 | Status: SHIPPED | OUTPATIENT
Start: 2019-06-10 | End: 2019-09-18

## 2019-06-10 RX ORDER — GLIMEPIRIDE 2 MG/1
TABLET ORAL
Qty: 270 TABLET | Refills: 0 | Status: SHIPPED | OUTPATIENT
Start: 2019-06-10 | End: 2019-07-29

## 2019-06-11 RX ORDER — OMEPRAZOLE 40 MG/1
CAPSULE, DELAYED RELEASE ORAL
Qty: 90 CAPSULE | Refills: 0 | Status: SHIPPED | OUTPATIENT
Start: 2019-06-11

## 2019-07-12 ENCOUNTER — OFFICE VISIT (OUTPATIENT)
Dept: OPTOMETRY | Facility: CLINIC | Age: 69
End: 2019-07-12
Payer: COMMERCIAL

## 2019-07-12 DIAGNOSIS — H25.13 AGE-RELATED NUCLEAR CATARACT OF BOTH EYES: ICD-10-CM

## 2019-07-12 DIAGNOSIS — H52.203 HYPEROPIA WITH ASTIGMATISM AND PRESBYOPIA, BILATERAL: ICD-10-CM

## 2019-07-12 DIAGNOSIS — H52.03 HYPEROPIA WITH ASTIGMATISM AND PRESBYOPIA, BILATERAL: ICD-10-CM

## 2019-07-12 DIAGNOSIS — E11.9 DIABETES MELLITUS, STABLE (HCC): Primary | ICD-10-CM

## 2019-07-12 DIAGNOSIS — H52.4 HYPEROPIA WITH ASTIGMATISM AND PRESBYOPIA, BILATERAL: ICD-10-CM

## 2019-07-12 PROCEDURE — 92014 COMPRE OPH EXAM EST PT 1/>: CPT | Performed by: OPTOMETRIST

## 2019-07-12 NOTE — ASSESSMENT & PLAN NOTE
Patient does not want an RX .  He is content with his +3.25 for reading and will use +2.00 for TV etc.

## 2019-07-12 NOTE — PROGRESS NOTES
Gregg Singh is a 77year old male. HPI:     HPI     Diabetic Eye Exam     Diabetes characteristics include controlled with diet, Type 2 and taking oral medications. Duration of 17 years. Number of years diabetic 16. Number of years on pills 17.   Len To Smoking status: Never Smoker      Smokeless tobacco: Never Used    Alcohol use: Not Currently    Drug use: No      Medications:    Current Outpatient Medications:  OMEPRAZOLE 40 MG Oral Capsule Delayed Release TAKE ONE CAPSULE BY MOUTH ONE TIME DAILY  Harper Ruano Right Left    External Normal Normal          Slit Lamp Exam       Right Left    Lids/Lashes Normal Normal    Conjunctiva/Sclera Nasal/temp pinguecula Nasal/temp pinguecula    Cornea Clear Clear    Anterior Chamber Deep and quiet Deep and quiet    Iris Nor this encounter        Follow up instructions:  Return in about 1 year (around 7/12/2020) for Diabetic Eye exam.    7/12/2019  Scribed by: Shannan Garrison

## 2019-07-22 ENCOUNTER — OFFICE VISIT (OUTPATIENT)
Dept: SURGERY | Facility: CLINIC | Age: 69
End: 2019-07-22
Payer: COMMERCIAL

## 2019-07-22 VITALS
DIASTOLIC BLOOD PRESSURE: 79 MMHG | SYSTOLIC BLOOD PRESSURE: 156 MMHG | BODY MASS INDEX: 26 KG/M2 | OXYGEN SATURATION: 96 % | HEART RATE: 78 BPM | WEIGHT: 179 LBS | RESPIRATION RATE: 16 BRPM

## 2019-07-22 DIAGNOSIS — K74.69 CRYPTOGENIC CIRRHOSIS OF LIVER (HCC): Primary | ICD-10-CM

## 2019-07-24 NOTE — PROGRESS NOTES
[facility] at [facility]  [address], [address]  [address], [address]  [phone] ([phone]) ([facility] 75.) 04/2017   • Esophageal reflux    • Hearing loss    • Herniated intervertebral disc of lumbar spine    • Measles    • Mumps    • Pneumonia 12-25-14   • Sinus problem        Past Surgical History:   Procedure Laterality Date   • COLONOSCOPY N/A 4/16/20 Socioeconomic History      Marital status:       Spouse name: Not on file      Number of children: Not on file      Years of education: Not on file      Highest education level: Not on file    Tobacco Use      Smoking status: Never Smoker      Smoke

## 2019-07-27 ENCOUNTER — LAB ENCOUNTER (OUTPATIENT)
Dept: LAB | Facility: HOSPITAL | Age: 69
End: 2019-07-27
Attending: INTERNAL MEDICINE
Payer: COMMERCIAL

## 2019-07-27 DIAGNOSIS — Z12.5 SCREENING FOR PROSTATE CANCER: ICD-10-CM

## 2019-07-27 DIAGNOSIS — K74.69 CRYPTOGENIC CIRRHOSIS OF LIVER (HCC): ICD-10-CM

## 2019-07-27 DIAGNOSIS — E11.65 UNCONTROLLED TYPE 2 DIABETES MELLITUS WITH HYPERGLYCEMIA (HCC): ICD-10-CM

## 2019-07-27 LAB
AFP-TM SERPL-MCNC: 3.1 NG/ML (ref ?–8)
ALBUMIN SERPL-MCNC: 3.4 G/DL (ref 3.4–5)
ALBUMIN/GLOB SERPL: 0.8 {RATIO} (ref 1–2)
ALP LIVER SERPL-CCNC: 85 U/L (ref 45–117)
ALT SERPL-CCNC: 84 U/L (ref 16–61)
ANION GAP SERPL CALC-SCNC: 6 MMOL/L (ref 0–18)
AST SERPL-CCNC: 57 U/L (ref 15–37)
BASOPHILS # BLD AUTO: 0.04 X10(3) UL (ref 0–0.2)
BASOPHILS NFR BLD AUTO: 0.6 %
BILIRUB SERPL-MCNC: 1.3 MG/DL (ref 0.1–2)
BUN BLD-MCNC: 10 MG/DL (ref 7–18)
BUN/CREAT SERPL: 12.3 (ref 10–20)
CALCIUM BLD-MCNC: 9.2 MG/DL (ref 8.5–10.1)
CHLORIDE SERPL-SCNC: 103 MMOL/L (ref 98–112)
CO2 SERPL-SCNC: 30 MMOL/L (ref 21–32)
COMPLEXED PSA SERPL-MCNC: 0.97 NG/ML (ref ?–4)
CREAT BLD-MCNC: 0.81 MG/DL (ref 0.7–1.3)
DEPRECATED RDW RBC AUTO: 40.7 FL (ref 35.1–46.3)
EOSINOPHIL # BLD AUTO: 0.14 X10(3) UL (ref 0–0.7)
EOSINOPHIL NFR BLD AUTO: 2 %
ERYTHROCYTE [DISTWIDTH] IN BLOOD BY AUTOMATED COUNT: 12.2 % (ref 11–15)
EST. AVERAGE GLUCOSE BLD GHB EST-MCNC: 278 MG/DL (ref 68–126)
GLOBULIN PLAS-MCNC: 4.5 G/DL (ref 2.8–4.4)
GLUCOSE BLD-MCNC: 204 MG/DL (ref 70–99)
HBA1C MFR BLD HPLC: 11.3 % (ref ?–5.7)
HCT VFR BLD AUTO: 48.5 % (ref 39–53)
HGB BLD-MCNC: 16.4 G/DL (ref 13–17.5)
IMM GRANULOCYTES # BLD AUTO: 0.02 X10(3) UL (ref 0–1)
IMM GRANULOCYTES NFR BLD: 0.3 %
INR BLD: 1.11 (ref 0.9–1.2)
LYMPHOCYTES # BLD AUTO: 1.93 X10(3) UL (ref 1–4)
LYMPHOCYTES NFR BLD AUTO: 28.1 %
M PROTEIN MFR SERPL ELPH: 7.9 G/DL (ref 6.4–8.2)
MCH RBC QN AUTO: 30.9 PG (ref 26–34)
MCHC RBC AUTO-ENTMCNC: 33.8 G/DL (ref 31–37)
MCV RBC AUTO: 91.5 FL (ref 80–100)
MONOCYTES # BLD AUTO: 0.48 X10(3) UL (ref 0.1–1)
MONOCYTES NFR BLD AUTO: 7 %
NEUTROPHILS # BLD AUTO: 4.25 X10 (3) UL (ref 1.5–7.7)
NEUTROPHILS # BLD AUTO: 4.25 X10(3) UL (ref 1.5–7.7)
NEUTROPHILS NFR BLD AUTO: 62 %
OSMOLALITY SERPL CALC.SUM OF ELEC: 293 MOSM/KG (ref 275–295)
PATIENT FASTING: YES
PLATELET # BLD AUTO: 142 10(3)UL (ref 150–450)
POTASSIUM SERPL-SCNC: 4.5 MMOL/L (ref 3.5–5.1)
PROTHROMBIN TIME: 14.1 SECONDS (ref 11.8–14.5)
RBC # BLD AUTO: 5.3 X10(6)UL (ref 3.8–5.8)
SODIUM SERPL-SCNC: 139 MMOL/L (ref 136–145)
WBC # BLD AUTO: 6.9 X10(3) UL (ref 4–11)

## 2019-07-27 PROCEDURE — 36415 COLL VENOUS BLD VENIPUNCTURE: CPT

## 2019-07-27 PROCEDURE — 85610 PROTHROMBIN TIME: CPT

## 2019-07-27 PROCEDURE — 85025 COMPLETE CBC W/AUTO DIFF WBC: CPT

## 2019-07-27 PROCEDURE — 80053 COMPREHEN METABOLIC PANEL: CPT

## 2019-07-27 PROCEDURE — 82105 ALPHA-FETOPROTEIN SERUM: CPT

## 2019-07-27 PROCEDURE — 83036 HEMOGLOBIN GLYCOSYLATED A1C: CPT

## 2019-07-29 ENCOUNTER — OFFICE VISIT (OUTPATIENT)
Dept: INTERNAL MEDICINE CLINIC | Facility: CLINIC | Age: 69
End: 2019-07-29
Payer: COMMERCIAL

## 2019-07-29 VITALS
DIASTOLIC BLOOD PRESSURE: 64 MMHG | TEMPERATURE: 98 F | BODY MASS INDEX: 27.11 KG/M2 | OXYGEN SATURATION: 99 % | WEIGHT: 183 LBS | HEART RATE: 64 BPM | SYSTOLIC BLOOD PRESSURE: 130 MMHG | HEIGHT: 69 IN | RESPIRATION RATE: 19 BRPM

## 2019-07-29 DIAGNOSIS — D68.61 ANTIPHOSPHOLIPID ANTIBODY WITH HYPERCOAGULABLE STATE (HCC): ICD-10-CM

## 2019-07-29 DIAGNOSIS — E04.2 MULTIPLE THYROID NODULES: ICD-10-CM

## 2019-07-29 DIAGNOSIS — Z00.00 ROUTINE GENERAL MEDICAL EXAMINATION AT A HEALTH CARE FACILITY: Primary | ICD-10-CM

## 2019-07-29 DIAGNOSIS — I70.0 ATHEROSCLEROSIS OF ABDOMINAL AORTA (HCC): ICD-10-CM

## 2019-07-29 DIAGNOSIS — K74.69 CRYPTOGENIC CIRRHOSIS OF LIVER (HCC): ICD-10-CM

## 2019-07-29 DIAGNOSIS — K76.0 HEPATIC STEATOSIS: ICD-10-CM

## 2019-07-29 DIAGNOSIS — Z12.5 SCREENING FOR PROSTATE CANCER: ICD-10-CM

## 2019-07-29 DIAGNOSIS — H91.90 HEARING DIFFICULTY, UNSPECIFIED LATERALITY: ICD-10-CM

## 2019-07-29 DIAGNOSIS — Z86.010 HX OF ADENOMATOUS COLONIC POLYPS: ICD-10-CM

## 2019-07-29 DIAGNOSIS — E11.65 UNCONTROLLED TYPE 2 DIABETES MELLITUS WITH HYPERGLYCEMIA (HCC): ICD-10-CM

## 2019-07-29 DIAGNOSIS — E04.1 NODULAR THYROID DISEASE: ICD-10-CM

## 2019-07-29 DIAGNOSIS — K74.69 OTHER CIRRHOSIS OF LIVER (HCC): ICD-10-CM

## 2019-07-29 LAB
CREAT UR-SCNC: 44.9 MG/DL
MICROALBUMIN UR-MCNC: <0.5 MG/DL

## 2019-07-29 PROCEDURE — 82043 UR ALBUMIN QUANTITATIVE: CPT | Performed by: INTERNAL MEDICINE

## 2019-07-29 PROCEDURE — 82570 ASSAY OF URINE CREATININE: CPT | Performed by: INTERNAL MEDICINE

## 2019-07-29 PROCEDURE — 99397 PER PM REEVAL EST PAT 65+ YR: CPT | Performed by: INTERNAL MEDICINE

## 2019-07-29 RX ORDER — GLIMEPIRIDE 2 MG/1
TABLET ORAL
Qty: 360 TABLET | Refills: 0 | Status: SHIPPED | OUTPATIENT
Start: 2019-07-29 | End: 2020-04-08

## 2019-07-29 NOTE — PROGRESS NOTES
Kaia Miller is a 77year old male. Patient presents with:  Physical: pt in for Annual Physical       HPI:         Feels well generally. Checking  BS's  rarely-cared for wife who had mastectomy. Had eye exam 7/19, no retinopathy.   Wife states hearin Ag Guerra MD at [address] [place] [address] ENDOSCOPY   • COLONOSCOPY N/A 4/20/2018    Performed by Kayla Leroy MD at [address] [place] [address] ENDOSCOPY   • ESOPHAGOGASTRODUODENOSCOPY (EGD) N/A 4/20/2018    Performed by Kayla Leroy MD at [address] [place] [address] ENDOSCOPY   • HAND/FINGER SURGERY UNLISTED Right acute distress  HEENT-pupils equal,  extraocular muscles intact. Conjunctive pink, sclerae anicteric  Neck-supple, no carotid bruits, no adenopathy. Thyroid with nodularity bilaterally  Lungs-clear to auscultation   Heart-S1-S2 normal, no S3 or murmur. 35.1 - 46.3 fL    RDW 12.2 11.0 - 15.0 %    .0 (L) 150.0 - 450.0 10(3)uL    Neutrophil Absolute Prelim 4.25 1.50 - 7.70 x10 (3) uL    Neutrophil Absolute 4.25 1.50 - 7.70 x10(3) uL    Lymphocyte Absolute 1.93 1.00 - 4.00 x10(3) uL    Monocyte Absolu blood sugar, decrease weight    (Z12.5) Screening for prostate cancer  Plan: Does not wish check at this time.   PSA normal.  Explained importance of exam as well as lab test to check for prostate cancer          Imaging & Consults:  None    Meds & Refills

## 2019-08-01 ENCOUNTER — TELEPHONE (OUTPATIENT)
Dept: GASTROENTEROLOGY | Facility: CLINIC | Age: 69
End: 2019-08-01

## 2019-08-01 NOTE — TELEPHONE ENCOUNTER
----- Message from Dory Conley RN sent at 2019  8:31 AM CDT -----  Regardin month CLN recall  PATIENT OF DR. SALAS    Entered into Epic:Recall colon in 6 months per Dr. Serena Rayo. Last Colon done 19, next due 10/16/2019. Snapshot updated.

## 2019-08-09 ENCOUNTER — TELEPHONE (OUTPATIENT)
Dept: SURGERY | Facility: CLINIC | Age: 69
End: 2019-08-09

## 2019-08-09 NOTE — TELEPHONE ENCOUNTER
Completed peer to peer for ordered abd MRI. Approval # [id number].  Valid 8/7/19  To 10/5/19    FREDRICK Morton  Nurse Practitioner, Hepatology  [phone] (office)

## 2019-08-21 ENCOUNTER — HOSPITAL ENCOUNTER (OUTPATIENT)
Dept: MRI IMAGING | Facility: HOSPITAL | Age: 69
Discharge: HOME | End: 2019-08-21
Attending: INTERNAL MEDICINE
Payer: COMMERCIAL

## 2019-08-21 DIAGNOSIS — K74.69 CRYPTOGENIC CIRRHOSIS (HCC): ICD-10-CM

## 2019-08-21 PROCEDURE — 74183 MRI ABD W/O CNTR FLWD CNTR: CPT | Performed by: INTERNAL MEDICINE

## 2019-08-21 PROCEDURE — A9575 INJ GADOTERATE MEGLUMI 0.1ML: HCPCS | Performed by: INTERNAL MEDICINE

## 2019-09-08 DIAGNOSIS — K74.69 CRYPTOGENIC CIRRHOSIS OF LIVER (HCC): Primary | ICD-10-CM

## 2019-09-08 DIAGNOSIS — K76.9 LIVER LESION: ICD-10-CM

## 2019-09-21 RX ORDER — METFORMIN HYDROCHLORIDE 500 MG/1
TABLET, EXTENDED RELEASE ORAL
Qty: 360 TABLET | Refills: 0 | Status: SHIPPED | OUTPATIENT
Start: 2019-09-21 | End: 2020-04-08

## 2019-09-21 RX ORDER — GLIMEPIRIDE 2 MG/1
TABLET ORAL
Qty: 270 TABLET | Refills: 0 | Status: SHIPPED | OUTPATIENT
Start: 2019-09-21

## 2019-09-21 NOTE — TELEPHONE ENCOUNTER
Diabetic Medication Protocol Failed9/20 9:23 PM   Last HgBA1C < 7.5    HgBA1C procedure resulted in past 6 months    Microalbumin procedure in past 12 months or taking ACE/ARB    Appointment in past 6 or next 3 months     Last OV 7/29/19   No Future appt

## 2019-09-25 ENCOUNTER — TELEPHONE (OUTPATIENT)
Dept: GASTROENTEROLOGY | Facility: CLINIC | Age: 69
End: 2019-09-25

## 2019-09-25 NOTE — TELEPHONE ENCOUNTER
Patient's wife contacted (on HIPAA) and recall below updated. Dr. Grabiel Rodriguez please advise on orders and prep. Thank you.

## 2019-09-25 NOTE — TELEPHONE ENCOUNTER
Pts wife Shanae Renteria called to schedule 6 month repeat colonoscopy. Pt received letter in mail but refused TCS. Please call.

## 2019-09-25 NOTE — TELEPHONE ENCOUNTER
Please schedule patient for colonoscopy with MAC for diagnosis of history of adenomatous colon polyps at the hospital or [facility]    Hold glimepiride,metformin the day of and day of the procedure.     Prescribe 4 L split trilyte    Thanks    Farzaneh Todd,

## 2019-09-25 NOTE — TELEPHONE ENCOUNTER
LMTCB. Please transfer to triage.       Last Procedure:  Colonoscopy 04/16/19  Last Diagnosis:  Adenomatous colon polyp  Recalled for (months or years): 6 months  Sedation used previously:  MAC  Last Prep Used (if known):    Quality of prep (if known):good was carefully inserted and advanced without difficulty to the cecum using the air insufflation technique (only Co2 was used for insufflation). The cecum was identified by localizing the trifold, the appendix and the ileocecal valve.  Withdrawal was begun wi sooner. 3. Continue your current medications  4. Follow up with Dr. Arlene Bliss     Final Diagnosis:      Ileocecal valve/cecal polyp; endoscopic mucosal resection:  · Fragments of tubular adenoma (2.1 cm in maximum dimension in aggregate).   · No evidence of s

## 2019-09-30 RX ORDER — POLYETHYLENE GLYCOL 3350, SODIUM CHLORIDE, SODIUM BICARBONATE, POTASSIUM CHLORIDE 420; 11.2; 5.72; 1.48 G/4L; G/4L; G/4L; G/4L
POWDER, FOR SOLUTION ORAL
Qty: 1 BOTTLE | Refills: 0 | Status: SHIPPED | OUTPATIENT
Start: 2019-09-30

## 2019-09-30 NOTE — TELEPHONE ENCOUNTER
Scheduled for:  Colonoscopy [address]  Provider Name: Dr. Merlin Merritt  Date:  12/11/19  Location:  [facility]  Sedation:  MAC  Time:   0800 (pt is aware that [facility] will call the day before to confirm arrival time)    Prep:  Trilyte, sent via PerfectPost/Allergies Recon

## 2019-12-11 ENCOUNTER — SURGERY CENTER DOCUMENTATION (OUTPATIENT)
Dept: SURGERY | Age: 69
End: 2019-12-11

## 2019-12-11 ENCOUNTER — LAB REQUISITION (OUTPATIENT)
Dept: LAB | Facility: HOSPITAL | Age: 69
End: 2019-12-11
Payer: COMMERCIAL

## 2019-12-11 DIAGNOSIS — D12.6 ADENOMATOUS POLYP OF COLON, UNSPECIFIED PART OF COLON: ICD-10-CM

## 2019-12-11 DIAGNOSIS — Z01.89 ENCOUNTER FOR OTHER SPECIFIED SPECIAL EXAMINATIONS: ICD-10-CM

## 2019-12-11 PROCEDURE — 88305 TISSUE EXAM BY PATHOLOGIST: CPT | Performed by: INTERNAL MEDICINE

## 2019-12-11 PROCEDURE — 45385 COLONOSCOPY W/LESION REMOVAL: CPT | Performed by: INTERNAL MEDICINE

## 2019-12-11 PROCEDURE — 45381 COLONOSCOPY SUBMUCOUS NJX: CPT | Performed by: INTERNAL MEDICINE

## 2019-12-11 PROCEDURE — 45380 COLONOSCOPY AND BIOPSY: CPT | Performed by: INTERNAL MEDICINE

## 2019-12-11 NOTE — PROCEDURES
Colonoscopy Report    Nicole Bryant     1950 Age 77year old   PCP Marco Antonio Barba MD Endoscopist Syl Nicole MD     Date of procedure: 19    Procedure: Colonoscopy w/ biopsy, submucosal injection, and snare polypectomy    Pre-op patient’s vital signs were monitored throughout the procedure and remained stable.     Estimated blood loss: insignificant    Specimens collected:  Colon polyps    Complications: none     Colonoscopy findings:  Terminal ileum: the 5-10 cm of distal terminal hemorrhoids    Recommend:  1. Await pathology. 2. Repeat colonoscopy in 6-12 months. If new signs or symptoms develop, procedure may need to be repeated sooner.    3. Continue your current medications    >>>If biopsies were performed and you have not rece

## 2019-12-12 ENCOUNTER — TELEPHONE (OUTPATIENT)
Dept: GASTROENTEROLOGY | Facility: CLINIC | Age: 69
End: 2019-12-12

## 2019-12-12 NOTE — TELEPHONE ENCOUNTER
Called patient and discussed with his wife regarding path which was discussed prior    Discussed his path showing some pre-cancerous areas (adenomas) smaller area of recurrence than anticipated based on endoscopic appearance but discussed still recommendat

## 2019-12-12 NOTE — TELEPHONE ENCOUNTER
Entered into Epic:Recall colon in 6 months pe Dr. Bartolo Sewell. Last Colon done 12/12/2019, next due 6/12/2020. HM updated.

## 2019-12-15 ENCOUNTER — TELEPHONE (OUTPATIENT)
Dept: INTERNAL MEDICINE CLINIC | Facility: CLINIC | Age: 69
End: 2019-12-15

## 2019-12-15 DIAGNOSIS — E04.2 MULTIPLE THYROID NODULES: ICD-10-CM

## 2019-12-15 DIAGNOSIS — E11.65 UNCONTROLLED TYPE 2 DIABETES MELLITUS WITH HYPERGLYCEMIA (HCC): ICD-10-CM

## 2019-12-15 DIAGNOSIS — K74.69 CRYPTOGENIC CIRRHOSIS OF LIVER (HCC): Primary | ICD-10-CM

## 2020-01-29 ENCOUNTER — HOSPITAL ENCOUNTER (OUTPATIENT)
Dept: MRI IMAGING | Facility: HOSPITAL | Age: 70
Discharge: HOME | End: 2020-01-29
Attending: INTERNAL MEDICINE
Payer: COMMERCIAL

## 2020-01-29 DIAGNOSIS — K76.9 LIVER LESION: ICD-10-CM

## 2020-01-29 DIAGNOSIS — K74.69 CRYPTOGENIC CIRRHOSIS OF LIVER (HCC): ICD-10-CM

## 2020-01-29 LAB — CREAT BLD-MCNC: 0.7 MG/DL (ref 0.7–1.3)

## 2020-01-29 PROCEDURE — A9575 INJ GADOTERATE MEGLUMI 0.1ML: HCPCS | Performed by: INTERNAL MEDICINE

## 2020-01-29 PROCEDURE — 82565 ASSAY OF CREATININE: CPT

## 2020-01-29 PROCEDURE — 74183 MRI ABD W/O CNTR FLWD CNTR: CPT | Performed by: INTERNAL MEDICINE

## 2020-02-24 ENCOUNTER — OFFICE VISIT (OUTPATIENT)
Dept: SURGERY | Facility: CLINIC | Age: 70
End: 2020-02-24
Payer: COMMERCIAL

## 2020-02-24 VITALS
OXYGEN SATURATION: 97 % | BODY MASS INDEX: 27.11 KG/M2 | DIASTOLIC BLOOD PRESSURE: 81 MMHG | HEIGHT: 69 IN | HEART RATE: 71 BPM | SYSTOLIC BLOOD PRESSURE: 142 MMHG | WEIGHT: 183 LBS | RESPIRATION RATE: 18 BRPM

## 2020-02-24 DIAGNOSIS — K74.69 CRYPTOGENIC CIRRHOSIS (HCC): Primary | ICD-10-CM

## 2020-02-25 NOTE — PROGRESS NOTES
[facility] at [address], [address]  [address]  [phone] ([phone]) 12-25-14   • Sinus problem        Past Surgical History:   Procedure Laterality Date   • COLONOSCOPY  12/11/2019   • COLONOSCOPY N/A 4/16/2019    Performed by Henry Rudd MD at [address] [place] [address] ENDOSCOPY   • COLONOSCOPY N/A 4/20/2018    Performed by Nancy Putnam daily Dx E11.9 50 strip 5       Allergies: No Known Allergies    Social History    Socioeconomic History      Marital status:       Spouse name: Not on file      Number of children: Not on file      Years of education: Not on file      Highest educa

## 2020-04-01 ENCOUNTER — TELEPHONE (OUTPATIENT)
Dept: GASTROENTEROLOGY | Facility: CLINIC | Age: 70
End: 2020-04-01

## 2020-04-01 NOTE — TELEPHONE ENCOUNTER
----- Message from Rory Street RN sent at 2019  6:00 PM CST -----  Regardin month CLN recall  Entered into Epic:Recall colon in 6 months pe Dr. Jomar Miller. Last Colon done 2019, next due 2020. HM updated.

## 2020-04-08 ENCOUNTER — VIRTUAL PHONE E/M (OUTPATIENT)
Dept: INTERNAL MEDICINE CLINIC | Facility: CLINIC | Age: 70
End: 2020-04-08
Payer: COMMERCIAL

## 2020-04-08 DIAGNOSIS — E11.65 UNCONTROLLED TYPE 2 DIABETES MELLITUS WITH HYPERGLYCEMIA (HCC): Primary | ICD-10-CM

## 2020-04-08 DIAGNOSIS — E04.1 RIGHT THYROID NODULE: ICD-10-CM

## 2020-04-08 DIAGNOSIS — K76.0 HEPATIC STEATOSIS: ICD-10-CM

## 2020-04-08 DIAGNOSIS — I70.0 ATHEROSCLEROSIS OF ABDOMINAL AORTA (HCC): ICD-10-CM

## 2020-04-08 PROCEDURE — 99213 OFFICE O/P EST LOW 20 MIN: CPT | Performed by: INTERNAL MEDICINE

## 2020-04-08 RX ORDER — METFORMIN HYDROCHLORIDE 500 MG/1
1000 TABLET, EXTENDED RELEASE ORAL 2 TIMES DAILY WITH MEALS
Qty: 360 TABLET | Refills: 1 | Status: SHIPPED | OUTPATIENT
Start: 2020-04-08

## 2020-04-08 RX ORDER — GLIMEPIRIDE 2 MG/1
TABLET ORAL
Qty: 360 TABLET | Refills: 1 | Status: SHIPPED | OUTPATIENT
Start: 2020-04-08

## 2020-04-08 NOTE — PROGRESS NOTES
Virtual/Telephone Check-In    Belkis Began verbally consents to a Virtual/Telephone Check-In service on 04/08/20. Patient understands and accepts financial responsibility for any deductible, co-insurance and/or co-pays associated with this service.     D of uncontrolled diabetes especially, including atherosclerotic vascular disease, blindness, renal failure, increased liver disease

## 2020-05-12 ENCOUNTER — TELEPHONE (OUTPATIENT)
Dept: INTERNAL MEDICINE CLINIC | Facility: CLINIC | Age: 70
End: 2020-05-12

## 2021-03-08 DIAGNOSIS — Z23 NEED FOR VACCINATION: ICD-10-CM

## 2021-03-12 ENCOUNTER — IMMUNIZATION (OUTPATIENT)
Dept: LAB | Facility: HOSPITAL | Age: 71
End: 2021-03-12
Attending: HOSPITALIST
Payer: COMMERCIAL

## 2021-03-12 DIAGNOSIS — Z23 NEED FOR VACCINATION: Primary | ICD-10-CM

## 2021-03-12 PROCEDURE — 0011A SARSCOV2 VAC 100MCG/0.5ML IM: CPT

## 2021-04-09 ENCOUNTER — IMMUNIZATION (OUTPATIENT)
Dept: LAB | Facility: HOSPITAL | Age: 71
End: 2021-04-09
Attending: EMERGENCY MEDICINE
Payer: COMMERCIAL

## 2021-04-09 DIAGNOSIS — Z23 NEED FOR VACCINATION: Primary | ICD-10-CM

## 2021-04-09 PROCEDURE — 0012A SARSCOV2 VAC 100MCG/0.5ML IM: CPT

## 2021-04-28 ENCOUNTER — TELEPHONE (OUTPATIENT)
Dept: INTERNAL MEDICINE CLINIC | Facility: CLINIC | Age: 71
End: 2021-04-28

## 2022-08-02 ENCOUNTER — OFFICE VISIT (OUTPATIENT)
Dept: INTERNAL MEDICINE CLINIC | Facility: CLINIC | Age: 72
End: 2022-08-02
Payer: COMMERCIAL

## 2022-08-02 VITALS
DIASTOLIC BLOOD PRESSURE: 78 MMHG | BODY MASS INDEX: 25 KG/M2 | HEART RATE: 68 BPM | OXYGEN SATURATION: 96 % | TEMPERATURE: 97 F | SYSTOLIC BLOOD PRESSURE: 132 MMHG | WEIGHT: 167 LBS

## 2022-08-02 DIAGNOSIS — E11.49 TYPE II DIABETES MELLITUS WITH NEUROLOGICAL MANIFESTATIONS (HCC): ICD-10-CM

## 2022-08-02 DIAGNOSIS — E11.9 TYPE 2 DIABETES MELLITUS WITHOUT COMPLICATION, WITHOUT LONG-TERM CURRENT USE OF INSULIN (HCC): Primary | ICD-10-CM

## 2022-08-02 DIAGNOSIS — K74.69 OTHER CIRRHOSIS OF LIVER (HCC): ICD-10-CM

## 2022-08-02 LAB
AFP-TM SERPL-MCNC: 3.3 NG/ML (ref ?–8)
ALBUMIN SERPL-MCNC: 3.5 G/DL (ref 3.4–5)
ALBUMIN/GLOB SERPL: 0.9 {RATIO} (ref 1–2)
ALP LIVER SERPL-CCNC: 83 U/L
ALT SERPL-CCNC: 42 U/L
ANION GAP SERPL CALC-SCNC: 6 MMOL/L (ref 0–18)
AST SERPL-CCNC: 39 U/L (ref 15–37)
BASOPHILS # BLD AUTO: 0.03 X10(3) UL (ref 0–0.2)
BASOPHILS NFR BLD AUTO: 0.5 %
BILIRUB SERPL-MCNC: 1.1 MG/DL (ref 0.1–2)
BUN BLD-MCNC: 12 MG/DL (ref 7–18)
BUN/CREAT SERPL: 13.3 (ref 10–20)
CALCIUM BLD-MCNC: 8.7 MG/DL (ref 8.5–10.1)
CARTRIDGE LOT#: 966 NUMERIC
CHLORIDE SERPL-SCNC: 100 MMOL/L (ref 98–112)
CHOLEST SERPL-MCNC: 129 MG/DL (ref ?–200)
CO2 SERPL-SCNC: 27 MMOL/L (ref 21–32)
CREAT BLD-MCNC: 0.9 MG/DL
CREAT UR-SCNC: 34.4 MG/DL
DEPRECATED RDW RBC AUTO: 40.4 FL (ref 35.1–46.3)
EOSINOPHIL # BLD AUTO: 0.14 X10(3) UL (ref 0–0.7)
EOSINOPHIL NFR BLD AUTO: 2.1 %
ERYTHROCYTE [DISTWIDTH] IN BLOOD BY AUTOMATED COUNT: 12.2 % (ref 11–15)
FASTING PATIENT LIPID ANSWER: NO
FASTING STATUS PATIENT QL REPORTED: NO
GFR SERPLBLD BASED ON 1.73 SQ M-ARVRAT: 91 ML/MIN/1.73M2 (ref 60–?)
GLOBULIN PLAS-MCNC: 4.1 G/DL (ref 2.8–4.4)
GLUCOSE BLD-MCNC: 335 MG/DL (ref 70–99)
HCT VFR BLD AUTO: 45.7 %
HDLC SERPL-MCNC: 46 MG/DL (ref 40–59)
HEMOGLOBIN A1C: 9.6 % (ref 4.3–5.6)
HGB BLD-MCNC: 15.4 G/DL
IMM GRANULOCYTES # BLD AUTO: 0.02 X10(3) UL (ref 0–1)
IMM GRANULOCYTES NFR BLD: 0.3 %
INR BLD: 1.06 (ref 0.85–1.16)
LDLC SERPL CALC-MCNC: 58 MG/DL (ref ?–100)
LYMPHOCYTES # BLD AUTO: 1.83 X10(3) UL (ref 1–4)
LYMPHOCYTES NFR BLD AUTO: 27.7 %
MCH RBC QN AUTO: 30.6 PG (ref 26–34)
MCHC RBC AUTO-ENTMCNC: 33.7 G/DL (ref 31–37)
MCV RBC AUTO: 90.9 FL
MICROALBUMIN UR-MCNC: <0.5 MG/DL
MONOCYTES # BLD AUTO: 0.66 X10(3) UL (ref 0.1–1)
MONOCYTES NFR BLD AUTO: 10 %
NEUTROPHILS # BLD AUTO: 3.93 X10 (3) UL (ref 1.5–7.7)
NEUTROPHILS # BLD AUTO: 3.93 X10(3) UL (ref 1.5–7.7)
NEUTROPHILS NFR BLD AUTO: 59.4 %
NONHDLC SERPL-MCNC: 83 MG/DL (ref ?–130)
OSMOLALITY SERPL CALC.SUM OF ELEC: 289 MOSM/KG (ref 275–295)
PLATELET # BLD AUTO: 112 10(3)UL (ref 150–450)
POTASSIUM SERPL-SCNC: 4.3 MMOL/L (ref 3.5–5.1)
PROT SERPL-MCNC: 7.6 G/DL (ref 6.4–8.2)
PROTHROMBIN TIME: 13.7 SECONDS (ref 11.6–14.8)
RBC # BLD AUTO: 5.03 X10(6)UL
SODIUM SERPL-SCNC: 133 MMOL/L (ref 136–145)
TRIGL SERPL-MCNC: 147 MG/DL (ref 30–149)
VLDLC SERPL CALC-MCNC: 22 MG/DL (ref 0–30)
WBC # BLD AUTO: 6.6 X10(3) UL (ref 4–11)

## 2022-08-02 PROCEDURE — 82043 UR ALBUMIN QUANTITATIVE: CPT | Performed by: INTERNAL MEDICINE

## 2022-08-02 PROCEDURE — 80053 COMPREHEN METABOLIC PANEL: CPT | Performed by: INTERNAL MEDICINE

## 2022-08-02 PROCEDURE — 99214 OFFICE O/P EST MOD 30 MIN: CPT | Performed by: INTERNAL MEDICINE

## 2022-08-02 PROCEDURE — 85025 COMPLETE CBC W/AUTO DIFF WBC: CPT | Performed by: INTERNAL MEDICINE

## 2022-08-02 PROCEDURE — 3061F NEG MICROALBUMINURIA REV: CPT | Performed by: INTERNAL MEDICINE

## 2022-08-02 PROCEDURE — 3078F DIAST BP <80 MM HG: CPT | Performed by: INTERNAL MEDICINE

## 2022-08-02 PROCEDURE — 83036 HEMOGLOBIN GLYCOSYLATED A1C: CPT | Performed by: INTERNAL MEDICINE

## 2022-08-02 PROCEDURE — 82570 ASSAY OF URINE CREATININE: CPT | Performed by: INTERNAL MEDICINE

## 2022-08-02 PROCEDURE — 80061 LIPID PANEL: CPT | Performed by: INTERNAL MEDICINE

## 2022-08-02 PROCEDURE — 3046F HEMOGLOBIN A1C LEVEL >9.0%: CPT | Performed by: INTERNAL MEDICINE

## 2022-08-02 PROCEDURE — 3075F SYST BP GE 130 - 139MM HG: CPT | Performed by: INTERNAL MEDICINE

## 2022-08-02 PROCEDURE — 82105 ALPHA-FETOPROTEIN SERUM: CPT | Performed by: INTERNAL MEDICINE

## 2022-08-02 PROCEDURE — 85610 PROTHROMBIN TIME: CPT | Performed by: INTERNAL MEDICINE

## 2022-08-02 RX ORDER — GLIMEPIRIDE 2 MG/1
2 TABLET ORAL
Qty: 30 TABLET | Refills: 3 | Status: SHIPPED | OUTPATIENT
Start: 2022-08-02

## 2022-08-31 ENCOUNTER — HOSPITAL ENCOUNTER (OUTPATIENT)
Dept: ULTRASOUND IMAGING | Age: 72
Discharge: HOME | End: 2022-08-31
Attending: INTERNAL MEDICINE
Payer: COMMERCIAL

## 2022-08-31 DIAGNOSIS — K74.69 OTHER CIRRHOSIS OF LIVER (HCC): ICD-10-CM

## 2022-08-31 PROCEDURE — 76705 ECHO EXAM OF ABDOMEN: CPT | Performed by: INTERNAL MEDICINE

## 2022-09-06 ENCOUNTER — OFFICE VISIT (OUTPATIENT)
Dept: INTERNAL MEDICINE CLINIC | Facility: CLINIC | Age: 72
End: 2022-09-06
Payer: COMMERCIAL

## 2022-09-06 VITALS
SYSTOLIC BLOOD PRESSURE: 138 MMHG | WEIGHT: 171 LBS | TEMPERATURE: 97 F | OXYGEN SATURATION: 96 % | HEART RATE: 70 BPM | BODY MASS INDEX: 25 KG/M2 | DIASTOLIC BLOOD PRESSURE: 72 MMHG

## 2022-09-06 DIAGNOSIS — Z12.5 PROSTATE CANCER SCREENING: ICD-10-CM

## 2022-09-06 DIAGNOSIS — K74.69 OTHER CIRRHOSIS OF LIVER (HCC): ICD-10-CM

## 2022-09-06 DIAGNOSIS — D69.6 THROMBOCYTOPENIA (HCC): ICD-10-CM

## 2022-09-06 DIAGNOSIS — E11.9 TYPE 2 DIABETES MELLITUS WITHOUT COMPLICATION, WITHOUT LONG-TERM CURRENT USE OF INSULIN (HCC): Primary | ICD-10-CM

## 2022-09-06 PROCEDURE — 3075F SYST BP GE 130 - 139MM HG: CPT | Performed by: INTERNAL MEDICINE

## 2022-09-06 PROCEDURE — 3078F DIAST BP <80 MM HG: CPT | Performed by: INTERNAL MEDICINE

## 2022-09-06 PROCEDURE — 99214 OFFICE O/P EST MOD 30 MIN: CPT | Performed by: INTERNAL MEDICINE

## 2022-09-06 RX ORDER — ATORVASTATIN CALCIUM 40 MG/1
40 TABLET, FILM COATED ORAL NIGHTLY
Qty: 90 TABLET | Refills: 3 | Status: SHIPPED | OUTPATIENT
Start: 2022-09-06

## 2022-10-18 ENCOUNTER — TELEPHONE (OUTPATIENT)
Dept: INTERNAL MEDICINE CLINIC | Facility: CLINIC | Age: 72
End: 2022-10-18

## 2022-10-18 ENCOUNTER — OFFICE VISIT (OUTPATIENT)
Dept: INTERNAL MEDICINE CLINIC | Facility: CLINIC | Age: 72
End: 2022-10-18
Payer: COMMERCIAL

## 2022-10-18 VITALS
HEART RATE: 69 BPM | DIASTOLIC BLOOD PRESSURE: 64 MMHG | WEIGHT: 174 LBS | BODY MASS INDEX: 26 KG/M2 | SYSTOLIC BLOOD PRESSURE: 138 MMHG | TEMPERATURE: 97 F | OXYGEN SATURATION: 96 %

## 2022-10-18 DIAGNOSIS — K74.69 OTHER CIRRHOSIS OF LIVER (HCC): Primary | ICD-10-CM

## 2022-10-18 DIAGNOSIS — E11.49 TYPE II DIABETES MELLITUS WITH NEUROLOGICAL MANIFESTATIONS (HCC): ICD-10-CM

## 2022-10-18 DIAGNOSIS — E11.9 TYPE 2 DIABETES MELLITUS WITHOUT COMPLICATION, WITHOUT LONG-TERM CURRENT USE OF INSULIN (HCC): ICD-10-CM

## 2022-10-18 DIAGNOSIS — D12.6 ADENOMATOUS POLYP OF COLON, UNSPECIFIED PART OF COLON: ICD-10-CM

## 2022-10-18 DIAGNOSIS — I82.512 CHRONIC DEEP VEIN THROMBOSIS (DVT) OF FEMORAL VEIN OF LEFT LOWER EXTREMITY (HCC): ICD-10-CM

## 2022-10-18 PROCEDURE — 3075F SYST BP GE 130 - 139MM HG: CPT | Performed by: INTERNAL MEDICINE

## 2022-10-18 PROCEDURE — 3078F DIAST BP <80 MM HG: CPT | Performed by: INTERNAL MEDICINE

## 2022-10-18 PROCEDURE — 99214 OFFICE O/P EST MOD 30 MIN: CPT | Performed by: INTERNAL MEDICINE

## 2022-10-18 NOTE — ASSESSMENT & PLAN NOTE
When checks  160 in am  And 250 and higher in afternoon     Last A1c value was 9.6% done 8/2/2022.     Increase metformin        Refer to endo

## 2022-10-18 NOTE — ASSESSMENT & PLAN NOTE
Currently no DVT symptoms. Need to be vigilant for them.   Can always start back on Eliquis or warfarin.- discussed pros and cons

## 2022-11-10 NOTE — PROGRESS NOTES
Abstracted patient last diabetic eye exam from [facility]. Exam: 10/8/2022 with no retinopathy.

## 2023-01-19 ENCOUNTER — TELEPHONE (OUTPATIENT)
Dept: INTERNAL MEDICINE CLINIC | Facility: CLINIC | Age: 73
End: 2023-01-19

## 2023-01-28 ENCOUNTER — TELEPHONE (OUTPATIENT)
Dept: INTERNAL MEDICINE CLINIC | Facility: CLINIC | Age: 73
End: 2023-01-28

## 2023-01-28 DIAGNOSIS — E11.49 TYPE II DIABETES MELLITUS WITH NEUROLOGICAL MANIFESTATIONS (HCC): Primary | ICD-10-CM

## 2023-02-03 RX ORDER — GLIMEPIRIDE 2 MG/1
TABLET ORAL
Qty: 30 TABLET | Refills: 0 | OUTPATIENT
Start: 2023-02-03

## 2023-02-15 DIAGNOSIS — E11.49 TYPE II DIABETES MELLITUS WITH NEUROLOGICAL MANIFESTATIONS (HCC): Primary | ICD-10-CM

## 2023-02-16 RX ORDER — GLIMEPIRIDE 2 MG/1
2 TABLET ORAL
Qty: 30 TABLET | Refills: 0 | Status: SHIPPED | OUTPATIENT
Start: 2023-02-16

## 2023-02-27 ENCOUNTER — TELEPHONE (OUTPATIENT)
Dept: INTERNAL MEDICINE CLINIC | Facility: CLINIC | Age: 73
End: 2023-02-27

## 2023-02-27 DIAGNOSIS — E11.49 TYPE II DIABETES MELLITUS WITH NEUROLOGICAL MANIFESTATIONS (HCC): Primary | ICD-10-CM

## 2023-03-01 ENCOUNTER — LAB ENCOUNTER (OUTPATIENT)
Dept: LAB | Facility: HOSPITAL | Age: 73
End: 2023-03-01
Attending: INTERNAL MEDICINE
Payer: COMMERCIAL

## 2023-03-01 LAB
ALBUMIN SERPL-MCNC: 3.4 G/DL (ref 3.4–5)
ALBUMIN/GLOB SERPL: 0.8 {RATIO} (ref 1–2)
ALP LIVER SERPL-CCNC: 90 U/L
ALT SERPL-CCNC: 66 U/L
ANION GAP SERPL CALC-SCNC: 8 MMOL/L (ref 0–18)
AST SERPL-CCNC: 53 U/L (ref 15–37)
BILIRUB SERPL-MCNC: 1.4 MG/DL (ref 0.1–2)
BUN BLD-MCNC: 12 MG/DL (ref 7–18)
BUN/CREAT SERPL: 17.9 (ref 10–20)
CALCIUM BLD-MCNC: 9.4 MG/DL (ref 8.5–10.1)
CHLORIDE SERPL-SCNC: 102 MMOL/L (ref 98–112)
CHOLEST SERPL-MCNC: 75 MG/DL (ref ?–200)
CO2 SERPL-SCNC: 28 MMOL/L (ref 21–32)
COMPLEXED PSA SERPL-MCNC: 0.81 NG/ML (ref ?–4)
CREAT BLD-MCNC: 0.67 MG/DL
CREAT UR-SCNC: 145 MG/DL
EST. AVERAGE GLUCOSE BLD GHB EST-MCNC: 235 MG/DL (ref 68–126)
FASTING PATIENT LIPID ANSWER: YES
FASTING STATUS PATIENT QL REPORTED: YES
GFR SERPLBLD BASED ON 1.73 SQ M-ARVRAT: 100 ML/MIN/1.73M2 (ref 60–?)
GLOBULIN PLAS-MCNC: 4.4 G/DL (ref 2.8–4.4)
GLUCOSE BLD-MCNC: 113 MG/DL (ref 70–99)
HBA1C MFR BLD: 9.8 % (ref ?–5.7)
HDLC SERPL-MCNC: 44 MG/DL (ref 40–59)
LDLC SERPL CALC-MCNC: 10 MG/DL (ref ?–100)
MICROALBUMIN UR-MCNC: 1.32 MG/DL
MICROALBUMIN/CREAT 24H UR-RTO: 9.1 UG/MG (ref ?–30)
NONHDLC SERPL-MCNC: 31 MG/DL (ref ?–130)
OSMOLALITY SERPL CALC.SUM OF ELEC: 287 MOSM/KG (ref 275–295)
POTASSIUM SERPL-SCNC: 3.6 MMOL/L (ref 3.5–5.1)
PROT SERPL-MCNC: 7.8 G/DL (ref 6.4–8.2)
SODIUM SERPL-SCNC: 138 MMOL/L (ref 136–145)
TRIGL SERPL-MCNC: 114 MG/DL (ref 30–149)
VLDLC SERPL CALC-MCNC: 14 MG/DL (ref 0–30)

## 2023-03-01 PROCEDURE — 36415 COLL VENOUS BLD VENIPUNCTURE: CPT | Performed by: INTERNAL MEDICINE

## 2023-03-01 PROCEDURE — 80061 LIPID PANEL: CPT | Performed by: INTERNAL MEDICINE

## 2023-03-01 PROCEDURE — 83036 HEMOGLOBIN GLYCOSYLATED A1C: CPT | Performed by: INTERNAL MEDICINE

## 2023-03-01 PROCEDURE — 3046F HEMOGLOBIN A1C LEVEL >9.0%: CPT | Performed by: INTERNAL MEDICINE

## 2023-03-01 PROCEDURE — 82043 UR ALBUMIN QUANTITATIVE: CPT | Performed by: INTERNAL MEDICINE

## 2023-03-01 PROCEDURE — 82570 ASSAY OF URINE CREATININE: CPT | Performed by: INTERNAL MEDICINE

## 2023-03-01 PROCEDURE — 3061F NEG MICROALBUMINURIA REV: CPT | Performed by: INTERNAL MEDICINE

## 2023-03-01 PROCEDURE — 80053 COMPREHEN METABOLIC PANEL: CPT | Performed by: INTERNAL MEDICINE

## 2023-03-02 ENCOUNTER — OFFICE VISIT (OUTPATIENT)
Dept: INTERNAL MEDICINE CLINIC | Facility: CLINIC | Age: 73
End: 2023-03-02
Payer: COMMERCIAL

## 2023-03-02 VITALS — WEIGHT: 170 LBS | SYSTOLIC BLOOD PRESSURE: 136 MMHG | BODY MASS INDEX: 25 KG/M2 | DIASTOLIC BLOOD PRESSURE: 70 MMHG

## 2023-03-02 DIAGNOSIS — K74.69 OTHER CIRRHOSIS OF LIVER (HCC): Primary | ICD-10-CM

## 2023-03-02 DIAGNOSIS — E11.49 TYPE II DIABETES MELLITUS WITH NEUROLOGICAL MANIFESTATIONS (HCC): ICD-10-CM

## 2023-03-02 DIAGNOSIS — L85.3 DRY SKIN: ICD-10-CM

## 2023-03-02 DIAGNOSIS — D69.6 THROMBOCYTOPENIA (HCC): ICD-10-CM

## 2023-03-02 PROCEDURE — 3078F DIAST BP <80 MM HG: CPT | Performed by: INTERNAL MEDICINE

## 2023-03-02 PROCEDURE — 99214 OFFICE O/P EST MOD 30 MIN: CPT | Performed by: INTERNAL MEDICINE

## 2023-03-02 PROCEDURE — 3075F SYST BP GE 130 - 139MM HG: CPT | Performed by: INTERNAL MEDICINE

## 2023-04-05 ENCOUNTER — HOSPITAL ENCOUNTER (OUTPATIENT)
Dept: ULTRASOUND IMAGING | Facility: HOSPITAL | Age: 73
Discharge: HOME | End: 2023-04-05
Attending: INTERNAL MEDICINE
Payer: COMMERCIAL

## 2023-04-05 DIAGNOSIS — K74.69 OTHER CIRRHOSIS OF LIVER (HCC): ICD-10-CM

## 2023-04-05 PROCEDURE — 76705 ECHO EXAM OF ABDOMEN: CPT | Performed by: INTERNAL MEDICINE

## 2023-06-21 ENCOUNTER — APPOINTMENT (OUTPATIENT)
Dept: GENERAL RADIOLOGY | Facility: HOSPITAL | Age: 73
End: 2023-06-21
Attending: EMERGENCY MEDICINE
Payer: MEDICARE

## 2023-06-21 ENCOUNTER — HOSPITAL ENCOUNTER (OUTPATIENT)
Age: 73
Discharge: ANOTHER HOSPITAL | End: 2023-06-21
Payer: MEDICARE

## 2023-06-21 ENCOUNTER — HOSPITAL ENCOUNTER (EMERGENCY)
Facility: HOSPITAL | Age: 73
Discharge: HOME | End: 2023-06-21
Attending: EMERGENCY MEDICINE
Payer: MEDICARE

## 2023-06-21 VITALS
SYSTOLIC BLOOD PRESSURE: 133 MMHG | OXYGEN SATURATION: 97 % | HEART RATE: 83 BPM | RESPIRATION RATE: 18 BRPM | TEMPERATURE: 97 F | DIASTOLIC BLOOD PRESSURE: 64 MMHG

## 2023-06-21 VITALS
OXYGEN SATURATION: 99 % | RESPIRATION RATE: 18 BRPM | SYSTOLIC BLOOD PRESSURE: 130 MMHG | TEMPERATURE: 99 F | BODY MASS INDEX: 25 KG/M2 | WEIGHT: 170 LBS | DIASTOLIC BLOOD PRESSURE: 66 MMHG | HEART RATE: 65 BPM

## 2023-06-21 DIAGNOSIS — L08.9 TOE INFECTION: Primary | ICD-10-CM

## 2023-06-21 DIAGNOSIS — E11.621 DIABETIC ULCER OF TOE OF LEFT FOOT ASSOCIATED WITH TYPE 2 DIABETES MELLITUS, UNSPECIFIED ULCER STAGE (HCC): Primary | ICD-10-CM

## 2023-06-21 DIAGNOSIS — R73.9 HYPERGLYCEMIA: ICD-10-CM

## 2023-06-21 DIAGNOSIS — L03.116 CELLULITIS OF LEFT LOWER EXTREMITY: ICD-10-CM

## 2023-06-21 DIAGNOSIS — L97.529 DIABETIC ULCER OF TOE OF LEFT FOOT ASSOCIATED WITH TYPE 2 DIABETES MELLITUS, UNSPECIFIED ULCER STAGE (HCC): Primary | ICD-10-CM

## 2023-06-21 LAB
ANION GAP SERPL CALC-SCNC: 7 MMOL/L (ref 0–18)
BASOPHILS # BLD AUTO: 0.03 X10(3) UL (ref 0–0.2)
BASOPHILS NFR BLD AUTO: 0.3 %
BUN BLD-MCNC: 9 MG/DL (ref 7–18)
BUN/CREAT SERPL: 10.8 (ref 10–20)
CALCIUM BLD-MCNC: 9.3 MG/DL (ref 8.5–10.1)
CHLORIDE SERPL-SCNC: 100 MMOL/L (ref 98–112)
CO2 SERPL-SCNC: 26 MMOL/L (ref 21–32)
CREAT BLD-MCNC: 0.83 MG/DL
DEPRECATED RDW RBC AUTO: 40 FL (ref 35.1–46.3)
EOSINOPHIL # BLD AUTO: 0.13 X10(3) UL (ref 0–0.7)
EOSINOPHIL NFR BLD AUTO: 1.4 %
ERYTHROCYTE [DISTWIDTH] IN BLOOD BY AUTOMATED COUNT: 12.8 % (ref 11–15)
GFR SERPLBLD BASED ON 1.73 SQ M-ARVRAT: 93 ML/MIN/1.73M2 (ref 60–?)
GLUCOSE BLD-MCNC: 374 MG/DL (ref 70–99)
GLUCOSE BLDC GLUCOMTR-MCNC: 300 MG/DL (ref 70–99)
GLUCOSE BLDC GLUCOMTR-MCNC: 389 MG/DL (ref 70–99)
GLUCOSE BLDC GLUCOMTR-MCNC: 434 MG/DL (ref 70–99)
HCT VFR BLD AUTO: 43.8 %
HGB BLD-MCNC: 15.3 G/DL
IMM GRANULOCYTES # BLD AUTO: 0.02 X10(3) UL (ref 0–1)
IMM GRANULOCYTES NFR BLD: 0.2 %
LYMPHOCYTES # BLD AUTO: 1.85 X10(3) UL (ref 1–4)
LYMPHOCYTES NFR BLD AUTO: 19.9 %
MCH RBC QN AUTO: 30.1 PG (ref 26–34)
MCHC RBC AUTO-ENTMCNC: 34.9 G/DL (ref 31–37)
MCV RBC AUTO: 86.2 FL
MONOCYTES # BLD AUTO: 0.77 X10(3) UL (ref 0.1–1)
MONOCYTES NFR BLD AUTO: 8.3 %
NEUTROPHILS # BLD AUTO: 6.51 X10 (3) UL (ref 1.5–7.7)
NEUTROPHILS # BLD AUTO: 6.51 X10(3) UL (ref 1.5–7.7)
NEUTROPHILS NFR BLD AUTO: 69.9 %
OSMOLALITY SERPL CALC.SUM OF ELEC: 290 MOSM/KG (ref 275–295)
PLATELET # BLD AUTO: 146 10(3)UL (ref 150–450)
POTASSIUM SERPL-SCNC: 3.9 MMOL/L (ref 3.5–5.1)
RBC # BLD AUTO: 5.08 X10(6)UL
SODIUM SERPL-SCNC: 133 MMOL/L (ref 136–145)
WBC # BLD AUTO: 9.3 X10(3) UL (ref 4–11)

## 2023-06-21 PROCEDURE — 82962 GLUCOSE BLOOD TEST: CPT

## 2023-06-21 PROCEDURE — 85025 COMPLETE CBC W/AUTO DIFF WBC: CPT | Performed by: EMERGENCY MEDICINE

## 2023-06-21 PROCEDURE — 80048 BASIC METABOLIC PNL TOTAL CA: CPT

## 2023-06-21 PROCEDURE — 85025 COMPLETE CBC W/AUTO DIFF WBC: CPT

## 2023-06-21 PROCEDURE — 87040 BLOOD CULTURE FOR BACTERIA: CPT | Performed by: EMERGENCY MEDICINE

## 2023-06-21 PROCEDURE — 99213 OFFICE O/P EST LOW 20 MIN: CPT

## 2023-06-21 PROCEDURE — 36415 COLL VENOUS BLD VENIPUNCTURE: CPT

## 2023-06-21 PROCEDURE — 80048 BASIC METABOLIC PNL TOTAL CA: CPT | Performed by: EMERGENCY MEDICINE

## 2023-06-21 PROCEDURE — 99285 EMERGENCY DEPT VISIT HI MDM: CPT

## 2023-06-21 PROCEDURE — 99284 EMERGENCY DEPT VISIT MOD MDM: CPT

## 2023-06-21 PROCEDURE — 96365 THER/PROPH/DIAG IV INF INIT: CPT

## 2023-06-21 PROCEDURE — 73660 X-RAY EXAM OF TOE(S): CPT | Performed by: EMERGENCY MEDICINE

## 2023-06-21 RX ORDER — INSULIN ASPART 100 [IU]/ML
0.15 INJECTION, SOLUTION INTRAVENOUS; SUBCUTANEOUS ONCE
Status: COMPLETED | OUTPATIENT
Start: 2023-06-21 | End: 2023-06-21

## 2023-06-21 RX ORDER — CIPROFLOXACIN 500 MG/1
500 TABLET, FILM COATED ORAL 2 TIMES DAILY
Qty: 20 TABLET | Refills: 0 | Status: SHIPPED | OUTPATIENT
Start: 2023-06-21 | End: 2023-07-01

## 2023-06-21 RX ORDER — CIPROFLOXACIN 2 MG/ML
400 INJECTION, SOLUTION INTRAVENOUS ONCE
Status: COMPLETED | OUTPATIENT
Start: 2023-06-21 | End: 2023-06-21

## 2023-06-21 RX ORDER — SULFAMETHOXAZOLE AND TRIMETHOPRIM 800; 160 MG/1; MG/1
1 TABLET ORAL 2 TIMES DAILY
Qty: 20 TABLET | Refills: 0 | Status: SHIPPED | OUTPATIENT
Start: 2023-06-21 | End: 2023-07-01

## 2023-06-21 NOTE — ED INITIAL ASSESSMENT (HPI)
Patient presents with left great toe redness that goes up his leg   On Sunday he scraped the big toe on stone and on Monday patient scraped his left shin while climbing in the window at home  Patient is diabetic, non compliant with medications   Patient without fever

## 2023-06-21 NOTE — ED QUICK NOTES
MD cleared patient for discharge. Patient provided with discharge paperwork and RN discussed plan of care. Patient given opportunity to ask any questions. MD prescribed 2 medications. Patient was in no apparent distress. Patient instructed to follow up with PCP. Patient ambulated out of ED with steady gait.

## 2023-06-21 NOTE — ED INITIAL ASSESSMENT (HPI)
Patient presents to the ED c/o left great toe pain since yesterday. Per pt he thinks he he scraped his toe in a swimming pool on Sunday. +swelling and redness extends from great toe to left shin.   +Diabetex hx  BS: 389

## 2023-07-12 DIAGNOSIS — E11.49 TYPE II DIABETES MELLITUS WITH NEUROLOGICAL MANIFESTATIONS (HCC): ICD-10-CM

## 2023-07-13 RX ORDER — GLIMEPIRIDE 2 MG/1
2 TABLET ORAL
Qty: 30 TABLET | Refills: 0 | Status: SHIPPED | OUTPATIENT
Start: 2023-07-13

## 2023-10-25 ENCOUNTER — TELEPHONE (OUTPATIENT)
Dept: INTERNAL MEDICINE CLINIC | Facility: CLINIC | Age: 73
End: 2023-10-25

## 2023-10-25 DIAGNOSIS — K74.69 OTHER CIRRHOSIS OF LIVER (HCC): ICD-10-CM

## 2023-10-25 DIAGNOSIS — E11.49 TYPE II DIABETES MELLITUS WITH NEUROLOGICAL MANIFESTATIONS (HCC): Primary | ICD-10-CM

## 2023-10-25 NOTE — TELEPHONE ENCOUNTER
Spoke to Tatum who scheduled an appointment for 11/8/23, will have Pt do labs a couple days prior to visit. What about colonoscopy?

## 2023-10-25 NOTE — TELEPHONE ENCOUNTER
----- Message from Laith García MD sent at 10/25/2023  7:54 AM CDT -----  Due for visit for  DM  orders in system to get before visit

## 2023-11-01 ENCOUNTER — LAB ENCOUNTER (OUTPATIENT)
Dept: LAB | Facility: HOSPITAL | Age: 73
End: 2023-11-01
Attending: INTERNAL MEDICINE
Payer: COMMERCIAL

## 2023-11-01 DIAGNOSIS — E11.49 TYPE II DIABETES MELLITUS WITH NEUROLOGICAL MANIFESTATIONS (HCC): ICD-10-CM

## 2023-11-01 DIAGNOSIS — K74.69 OTHER CIRRHOSIS OF LIVER (HCC): ICD-10-CM

## 2023-11-01 LAB
ALBUMIN SERPL-MCNC: 4 G/DL (ref 3.2–4.8)
ALBUMIN/GLOB SERPL: 1.1 {RATIO} (ref 1–2)
ALP LIVER SERPL-CCNC: 81 U/L
ALT SERPL-CCNC: 29 U/L
ANION GAP SERPL CALC-SCNC: 6 MMOL/L (ref 0–18)
AST SERPL-CCNC: 23 U/L (ref ?–34)
BILIRUB SERPL-MCNC: 1.6 MG/DL (ref 0.2–1.1)
BUN BLD-MCNC: 12 MG/DL (ref 9–23)
BUN/CREAT SERPL: 14.8 (ref 10–20)
CALCIUM BLD-MCNC: 9.2 MG/DL (ref 8.7–10.4)
CHLORIDE SERPL-SCNC: 100 MMOL/L (ref 98–112)
CHOLEST SERPL-MCNC: 140 MG/DL (ref ?–200)
CO2 SERPL-SCNC: 29 MMOL/L (ref 21–32)
CREAT BLD-MCNC: 0.81 MG/DL
CREAT UR-SCNC: 146.9 MG/DL
EGFRCR SERPLBLD CKD-EPI 2021: 94 ML/MIN/1.73M2 (ref 60–?)
EST. AVERAGE GLUCOSE BLD GHB EST-MCNC: 280 MG/DL (ref 68–126)
FASTING PATIENT LIPID ANSWER: YES
FASTING STATUS PATIENT QL REPORTED: YES
GLOBULIN PLAS-MCNC: 3.5 G/DL (ref 2.8–4.4)
GLUCOSE BLD-MCNC: 245 MG/DL (ref 70–99)
HBA1C MFR BLD: 11.4 % (ref ?–5.7)
HDLC SERPL-MCNC: 46 MG/DL (ref 40–59)
LDLC SERPL CALC-MCNC: 66 MG/DL (ref ?–100)
MICROALBUMIN UR-MCNC: 0.6 MG/DL
MICROALBUMIN/CREAT 24H UR-RTO: 4.1 UG/MG (ref ?–30)
NONHDLC SERPL-MCNC: 94 MG/DL (ref ?–130)
OSMOLALITY SERPL CALC.SUM OF ELEC: 288 MOSM/KG (ref 275–295)
POTASSIUM SERPL-SCNC: 3.9 MMOL/L (ref 3.5–5.1)
PROT SERPL-MCNC: 7.5 G/DL (ref 5.7–8.2)
SODIUM SERPL-SCNC: 135 MMOL/L (ref 136–145)
TRIGL SERPL-MCNC: 162 MG/DL (ref 30–149)
VLDLC SERPL CALC-MCNC: 24 MG/DL (ref 0–30)

## 2023-11-01 PROCEDURE — 82043 UR ALBUMIN QUANTITATIVE: CPT

## 2023-11-01 PROCEDURE — 36415 COLL VENOUS BLD VENIPUNCTURE: CPT

## 2023-11-01 PROCEDURE — 80053 COMPREHEN METABOLIC PANEL: CPT

## 2023-11-01 PROCEDURE — 83036 HEMOGLOBIN GLYCOSYLATED A1C: CPT

## 2023-11-01 PROCEDURE — 82570 ASSAY OF URINE CREATININE: CPT

## 2023-11-01 PROCEDURE — 80061 LIPID PANEL: CPT

## 2023-11-08 ENCOUNTER — OFFICE VISIT (OUTPATIENT)
Dept: INTERNAL MEDICINE CLINIC | Facility: CLINIC | Age: 73
End: 2023-11-08
Payer: MEDICARE

## 2023-11-08 VITALS
DIASTOLIC BLOOD PRESSURE: 60 MMHG | SYSTOLIC BLOOD PRESSURE: 132 MMHG | BODY MASS INDEX: 25 KG/M2 | OXYGEN SATURATION: 97 % | WEIGHT: 172 LBS | HEART RATE: 67 BPM

## 2023-11-08 DIAGNOSIS — D12.6 ADENOMATOUS POLYP OF COLON, UNSPECIFIED PART OF COLON: ICD-10-CM

## 2023-11-08 DIAGNOSIS — K74.69 OTHER CIRRHOSIS OF LIVER (HCC): ICD-10-CM

## 2023-11-08 DIAGNOSIS — D68.59 PROTEIN C DEFICIENCY (HCC): ICD-10-CM

## 2023-11-08 DIAGNOSIS — E11.49 TYPE II DIABETES MELLITUS WITH NEUROLOGICAL MANIFESTATIONS (HCC): Primary | ICD-10-CM

## 2023-11-08 PROCEDURE — 99214 OFFICE O/P EST MOD 30 MIN: CPT | Performed by: INTERNAL MEDICINE

## 2023-11-08 RX ORDER — GLIMEPIRIDE 2 MG/1
4 TABLET ORAL 2 TIMES DAILY
Qty: 360 TABLET | Refills: 1 | Status: SHIPPED | OUTPATIENT
Start: 2023-11-08

## 2024-01-18 ENCOUNTER — TELEPHONE (OUTPATIENT)
Dept: INTERNAL MEDICINE CLINIC | Facility: CLINIC | Age: 74
End: 2024-01-18

## 2024-01-18 NOTE — TELEPHONE ENCOUNTER
----- Message from Sam Phillips MD sent at 1/17/2024  6:38 PM CST -----  Have make appt to see me in [facility]

## 2024-01-18 NOTE — TELEPHONE ENCOUNTER
Per pcp, patient is due for medicare physical and follow up from November 2023 appointment.     Left message to give the office a call back to schedule.

## 2024-01-19 ENCOUNTER — TELEPHONE (OUTPATIENT)
Dept: INTERNAL MEDICINE CLINIC | Facility: CLINIC | Age: 74
End: 2024-01-19

## 2024-01-19 NOTE — TELEPHONE ENCOUNTER
----- Message from Sam Phillips MD sent at 1/19/2024 10:44 AM CST -----  Please call for appt  ----- Message -----  From: Nadia Subramanian  Sent: 1/18/2024   7:37 AM CST  To: Sam Phillips MD    L/m  ----- Message -----  From: Sam Phillips MD  Sent: 1/17/2024   6:38 PM CST  To: [facility] Front Office    Have make appt to see me in [facility]

## 2024-01-30 ENCOUNTER — TELEPHONE (OUTPATIENT)
Dept: INTERNAL MEDICINE CLINIC | Facility: CLINIC | Age: 74
End: 2024-01-30

## 2024-02-02 ENCOUNTER — OFFICE VISIT (OUTPATIENT)
Dept: ENDOCRINOLOGY CLINIC | Facility: CLINIC | Age: 74
End: 2024-02-02

## 2024-02-02 VITALS
WEIGHT: 177 LBS | HEIGHT: 69 IN | SYSTOLIC BLOOD PRESSURE: 158 MMHG | BODY MASS INDEX: 26.22 KG/M2 | HEART RATE: 70 BPM | DIASTOLIC BLOOD PRESSURE: 82 MMHG

## 2024-02-02 DIAGNOSIS — E11.49 TYPE II DIABETES MELLITUS WITH NEUROLOGICAL MANIFESTATIONS (HCC): ICD-10-CM

## 2024-02-02 DIAGNOSIS — E11.65 UNCONTROLLED TYPE 2 DIABETES MELLITUS WITH HYPERGLYCEMIA (HCC): Primary | ICD-10-CM

## 2024-02-02 LAB
CARTRIDGE LOT#: ABNORMAL NUMERIC
GLUCOSE BLOOD: 263
HEMOGLOBIN A1C: 9.3 % (ref 4.3–5.6)
TEST STRIP LOT #: NORMAL NUMERIC

## 2024-02-02 RX ORDER — EMPAGLIFLOZIN 25 MG/1
1 TABLET, FILM COATED ORAL DAILY
Qty: 90 TABLET | Refills: 1 | Status: SHIPPED | OUTPATIENT
Start: 2024-02-02 | End: 2024-03-03

## 2024-02-02 NOTE — PATIENT INSTRUCTIONS
Continue Metformin 1000mg twice daily     Continue Glimepiride 4mg twice daily     Start Jardiance 25mg once daily in the morning- stay well hydrated with a lot of regular water.

## 2024-02-02 NOTE — PROGRESS NOTES
Name: Kevin Diehl  Date: 2/2/2024    Referring Physician: Sam Phillips    HISTORY OF PRESENT ILLNESS   Kevin Diehl is a 73 year old male who presents for consult for diabetes mellitus.     Diabetes History:  Diagnosed- in early 20's   Patient has not had hospitalizations for blood sugar issues    Prior glycohemoglobin were: 11.4% 11/2023; 9.3% POC today   Glucose in clinic today- 263 mg/dl     Dietary compliance: Good- eating softer foods recently - no teeth. Admits was drinking a lot of soda in recent months and eating higher CHO foods- trying to cut back on soda.      Recall:  Breakfast- oatmeal or eggs with juice or milk   Lunch- sandwich   Dinner- protein with potato, or sausage with spaghetti   Snack- sometimes eats more of dinner meal between dinner and bedtime or sandwich   Beverages- unsweetened tea, regular soda- trying to cut back recently     Exercise: - None recently   Polyuria/polydipsia: No  Blurred vision: No s/p cataract surgery in one eye- planning for surgery in the other eye.     Episodes of hypoglycemia: No  Blood Glucose:    - Not checking blood sugars     Current DM Regimen:  Glimepiride 4 mg PO BID  Metformin 1000 mg PO BID       Modifying factors:  Medication adherence: Yes  Recent steroids, illness or infections: No       REVIEW OF SYSTEMS  Eyes: Diabetic retinopathy present: No            Most recent visit to eye doctor in last 12 months: Yes- s/p cataract surgery. Was last seen 11/2023    CV: Cardiovascular disease present: No         Hypertension present: No         Hyperlipidemia present: No         Peripheral Vascular Disease present: No    : Nephropathy present: No    Neuro: Neuropathy present: Yes- tingling in feet     Skin: Infection or ulceration: No    Osteoporosis: No    Thyroid disease: No      Medications:     Current Outpatient Medications:     Empagliflozin (JARDIANCE) 25 MG Oral Tab, Take 1 tablet by mouth daily., Disp: 90 tablet, Rfl: 1    glimepiride 2 MG Oral  Tab, Take 2 tablets (4 mg total) by mouth in the morning and 2 tablets (4 mg total) before bedtime., Disp: 360 tablet, Rfl: 1    metFORMIN 500 MG Oral Tab, Take 2 tablets (1,000 mg total) by mouth 2 (two) times daily with meals., Disp: 360 tablet, Rfl: 1    Glucose Blood (ONETOUCH ULTRA BLUE) In Vitro Strip, Test blood sugar daily Dx E11.9, Disp: 50 strip, Rfl: 5     Allergies:   No Known Allergies    Social History:   Social History     Socioeconomic History    Marital status:    Tobacco Use    Smoking status: Never    Smokeless tobacco: Never   Substance and Sexual Activity    Alcohol use: Not Currently    Drug use: No   Other Topics Concern    Caffeine Concern No       Medical History:   Past Medical History:   Diagnosis Date    Abdominal hernia     Navel Hernia    Bleeding disorder (HCC) 2017    Chickenpox     Colon polyps 04/2018    tubular adenomas    Decorative tattoo     Diabetes (HCC)     Disorder of liver     FATTY LIVER    DVT (deep venous thrombosis) (HCC) 04/2017    Esophageal reflux     Hearing loss     Herniated intervertebral disc of lumbar spine     Measles     Mumps     Pneumonia 12/25/2014    Sinus problem        Surgical history:   Past Surgical History:   Procedure Laterality Date    COLONOSCOPY N/A 4/20/2018    Procedure: COLONOSCOPY;  Surgeon: Jose Alejandro Boyer MD;  Location: [facility] ENDOSCOPY    COLONOSCOPY N/A 4/16/2019    Procedure: COLONOSCOPY;  Surgeon: Héctor Lucas MD;  Location: [facility] ENDOSCOPY    COLONOSCOPY  12/11/2019    HAND/FINGER SURGERY UNLISTED Right     hand surgery    HERNIA SURGERY      UMBILICAL HERNIA REPAIR         PHYSICAL EXAM  Vitals:    02/02/24 0739 02/02/24 0812   BP: 146/76 158/82   Pulse: 74 70   Weight: 177 lb (80.3 kg)    Height: 5' 9\" (1.753 m)        General Appearance:  alert, well developed, in no acute distress  Eyes:  normal conjunctivae, sclera, and normal pupils  Neck: Trachea midline: Normal  Back: no kyphosis or back tenderness  Lymph Nodes:  No  abnormal nodes noted  Musculoskeletal:  normal muscle strength and tone  Skin:  normal moisture and skin texture  Hair & Nails:  normal scalp hair     Hematologic:  no excessive bruising  Neuro:  sensory grossly intact and motor grossly intact.  Psychiatric:  oriented to time, self, and place  Nutritional:  no abnormal weight gain or loss    -foot exam deferred to next visit.     ASSESSMENT/PLAN:    Diabetes mellitus type 2 uncontrolled  -Hga1c- 9.3%  -Reviewed ABC's of diabetes    - Reviewed pathogenesis of diabetes.   - Reviewed importance of good glycemic control to prevent microvascular and macrovascular complications including nephropathy, neuropathy, retinopathy, and cardiovascular disease.  - Reviewed importance of SBGM- check glucose 1-2 times daily   - Reviewed target glucose goals for patient   fasting and <180 post prandially   - Reviewed importance of following diabetic diet- recommended 135 grams of CHO per day or 45 grams per meal.   - Provided patient education materials    - Interested in personal CGM but I did discuss Medicare will only cover for patients on insulin. Reviewed option for cash pay.     -Continue Metformin 1000mg twice daily. Reviewed side effects and risks vs benefits of medication    -Continue GLimepiride 4mg PO BID. Reviewed symptoms and management of hypoglycemia. Has been on Glimepiride for many years- would like to change for weekly GLP-1 agonist but not willing to do injectable therapy at this time.     -Discussed weekly GLP-1 agonist at length but he is not interested in injectable therapy.     -Start Jardiance 25mg PO daily. Reviewed side effects and risks vs benefits of medication. Reviewed importance of staying well hydrated on medication.     -Check glucose twice daily, reviewed glucose targets   -BP elevated, previous readings normal. He states he feels nervous. Given prior readings at goal will monitor.     -Lipids 11/2023- LDL- 66. Reviewed recommendation for  statin medication. He tolerated atorvastatin 40mg in the past. He was not willing to restart medication today but open to discussing again at next visit. Reviewed cardiac protective benefit of statin at length   - no nephropathy   -UTD with optho- last visit 12/2023  - Reviewed daily foot care and foot exam.       Return in about 2 months (around 4/2/2024).  2/2/2024  FREDRICK England    A total of 35 minutes was spent on obtaining history, reviewing pertinent imaging/labs and specialists notes, evaluating patient, providing multiple treatment options, reinforcing diet/exercise and compliance, and completing documentation.

## 2024-02-21 ENCOUNTER — TELEPHONE (OUTPATIENT)
Dept: ENDOCRINOLOGY CLINIC | Facility: CLINIC | Age: 74
End: 2024-02-21

## 2024-02-21 NOTE — TELEPHONE ENCOUNTER
Guadalupe states patient has been taking Jardiance and blood sugars are dropping too low.  States patient is at 80 mg/dL.  Please call.  Thank you.

## 2024-02-21 NOTE — TELEPHONE ENCOUNTER
Hypoglycemia    Onset of hypoglycemia:   Fluctuating since last visit (70- upper 200s)     BG levels:  4:30 AM today 88    Glucose while RN is on the phone 119, last meal was 12:30 PM today.    Wife said glucose would be in the 80s and pt would start to feel unwell. There had been times he was in the 70s but wife confirmed with RN twice that there had been no readings under 70.    They are unable to provide glucose readings the past week as the meter won't turn on.  Pt has not been writing glucose down.      Pattern of hypoglycemia:  Lunch time patient would normally feel nauseated, shaky, light headed    Symptoms:   When glucose falls in the 80s, he starts to be symptomatic    Acute illness:  Wife denies    Hypoglycemia Mx/Rule of 15:   Discussed with patient's wife    Change in Diet:  Cut down on high carb food and soda    List Medications/Compliance:   Jardiance 25 mg daily    Glimepiride 4 mg BID (confirmed with RN that pt is taking this med with food)    Metformin 1000 mg BID     RN advised/Disposition:   Initiate Rule of 15 discussed when sugar falls in the 70s.     Opt in for more complex carbs vs simple carbs.  Explained that simple carbs can bring glucose fast but can also crash glucose fast leading to hypoglycemia symptoms    Based on A1C and glucose readings in the chart. RN did explain to patient that symptoms could be related to body adjusting to better glucose readings and should get better once the body adjusts to this new readings.     Since wife is wondering if medications are too much now, RN instructed her to encourage patient to start writing down  glucose and provide those readings as this will also help APRN decide if medication will need to be adjusted safely since she reports it's been fluctuating.     Call and page on call provider if overnight sugar does fall under 70 in spite of initiating Rule of 15

## 2024-02-22 NOTE — TELEPHONE ENCOUNTER
Please ask him to stop Glimepiride and continue with Jardiance and Metformin only for now. Continue to monitor sugars and let us know if they start to become persistently >180's.

## 2024-02-22 NOTE — TELEPHONE ENCOUNTER
Spoke to patient's wife to relay message below - wife stated understanding for patient to stop glimepiride 4mg BID and continue MTF 1000mg BID and jardiance 25mg daily  Patient's wife stated she will contact clinic if BG readings are persistently >180

## 2024-02-23 ENCOUNTER — TELEPHONE (OUTPATIENT)
Facility: CLINIC | Age: 74
End: 2024-02-23

## 2024-02-23 DIAGNOSIS — Z86.010 PERSONAL HISTORY OF COLONIC POLYPS: ICD-10-CM

## 2024-02-23 DIAGNOSIS — Z12.11 SPECIAL SCREENING FOR MALIGNANT NEOPLASMS, COLON: Primary | ICD-10-CM

## 2024-02-23 NOTE — TELEPHONE ENCOUNTER
Colon recall.    Medications, pharmacy, and allergies verified with the patient.   Please advise on colonoscopy and bowel prep orders.     › H/W/BMI: 5'9\"/177lbs/26.13    Special comments/notes:  Recall details:     Last Procedure, Date, MD:   Colonoscopy, 12/11/19, MG   Last diagnosis: Adenomas   Recalled for (mth/yrs): 6 months   Sedation used previously: MAC   Last Prep Used: colyte   Quality of Prep: good     Telephone colon screening Questionnaires:  Yes No   Are you currently experiencing any new GI symptoms? [] [x]   Any issues with anesthesia? [] [x]   Personal history of Resp. Issues/Oxygen Use/GEN/COPD? [] [x]   History of devices Pacemaker/Defibrillator/Stents? [] [x]   History of Cardiac/CVA issues/(MI/Stroke):  [] [x]     Medication usage:  Yes  No   Anticoagulants: [] [x]   Diabetic Meds: Metformin, Jardiance [x] []   Weight loss meds (phentermine/Vyvanse/Saxsenda): [] [x]   Iron/Herbal/Multivitamin Supplement (RX/OTC): [] [x]   Usage of marijuana, CBD &/or vape products: [] [x]

## 2024-02-23 NOTE — TELEPHONE ENCOUNTER
Colonoscopy Report           Kevin Diehl      1950 Age 69 year old   PCP SONNY KEY MD Endoscopist Héctor Lucas MD      Date of procedure: 19     Procedure: Colonoscopy w/ biopsy, submucosal injection, and snare polypectomy     Pre-operative diagnosis: history of adenomatous colon polyps     Post-operative diagnosis: colon polyps, hemorrhoids     Sedation: monitored anesthesia care (MAC)     Consent: We discussed the risks/benefits and alternatives to this procedure, as well as the planned sedation. Informed consent was obtained from the patient after the risks of the procedure were discussed, including but not limited to bleeding, perforation, aspiration, infection, or possibility of a missed lesion as well as the risks of anesthesia including but not limited to cardiopulmonary complications. The patient signed informed consent and elected to proceed with Colonoscopy with intervention [i.e. Biopsy, control of bleeding, dilatation, polypectomy, endoscopic mucosal resection, etc.] as indicated.     Colonoscopy procedure: Once an adequate level of sedation was obtained a digital rectal exam was completed revealing normal tone and no masses palpated. Then the lubricated tip of the [id number] diagnostic video colonoscope was carefully inserted and advanced without difficulty to the cecum using the air insufflation technique (only Co2 was used for insufflation). The cecum was identified by localizing the trifold, the appendix and the ileocecal valve. Withdrawal was begun with thorough washing and careful examination of the colonic walls and folds. The ascending colon was viewed twice in the forward view. Photodocumentation was obtained. The bowel prep was good. Views of the colon were good with washing. Withdrawal time was 47 minutes.     Air was then withdrawn and the endoscope was removed. The patient tolerated the procedure well. There were no immediate postoperative  complications. The patient’s vital signs were monitored throughout the procedure and remained stable.     Estimated blood loss: insignificant     Specimens collected:  Colon polyps     Complications: none      Colonoscopy findings:  Terminal ileum: the 5-10 cm of distal terminal ileum showed normal mucosa. Over the lip of the ileocecal valve there appeared to be two separate areas suggestive of polyps. One area measuring 6-7 mm and the other approximately 8-9 mm. One area was injected with submucosal injection of Orise but did not lift well likely due to prior manipulation. The two polyps were removed by snare cautery (endocut settings) polypectomy. There was also an area adjacent but separate which was somewhat depressed about 2-3 mm in size possibly representing recurrent polyp or clip artifact removed by avulsion technique.     Cecum: there was a sessile polyp at the cecal base near but separate from the appendiceal orifice. It was somewhat discontinuous though 1 cm overall suggestive of an area of prior polypectomy. The area was injected with submucosal injection of Orise, with partial lifting. The area was partly removed with snare cautery (endocut setting) polypectomy. There was a small residual area removed by avulsion technique. Otherwise, normal mucosa and vascular pattern     Ascending colon: normal mucosa and vascular pattern     Transverse colon: there was a 5 mm polyp removed by cold snare polypectomy. Otherwise, normal mucosa and vascular pattern     Descending colon: normal mucosa and vascular pattern     Sigmoid colon: there were two polyps measuring 4-5 mm removed by cold biopsy and cold snare polypectomy. Otherwise, normal mucosa and vascular pattern     Rectum: retroflexed view showed small non-bleeding hemorrhoids. Otherwise, normal mucosa and vascular pattern.     Impression:  1. Areas in the ileocecal valve and cecum suggestive of recurrent polyps removed  2. 3 other small polyps removed  3.  Small non-bleeding hemorrhoids     Recommend:  1. Await pathology.   2. Repeat colonoscopy in 6-12 months. If new signs or symptoms develop, procedure may need to be repeated sooner.   3. Continue your current medications     >>>If biopsies were performed and you have not received your pathology results either by phone or letter within 2 weeks, please call our office at [phone].     Héctor Lucas MD  [facility]  12/11/2019     Final Diagnosis:      A. Ileocecal valve polyp; biopsy:  Fragments of tubular adenoma     B. Ileocecal valve; biopsy:  Fragment of tubular adenoma  Separate fragments of small bowel mucosa without significant histopathology     C. Cecum polyp; biopsy:  Colonic mucosa without significant histopathology  Additional deeper sections are examined     D. Ileocecal valve polyp #2; biopsy:  Colonic mucosa hypertrophic muscularis mucosa  Additional deeper sections are examined     E. Transverse colon polyp; biopsy:  Tubular adenoma     F. Sigmoid colon polyp; biopsy:  Hyperplastic polyp

## 2024-02-25 NOTE — TELEPHONE ENCOUNTER
Ok to schedule colonoscopy with MAC with split golytely for colorectal cancer screening and history of adenomatous colon polyps at the hospital on a Friday or Monday (any Monday after April 15th) with split colyte    Hold jardiance 4 days prior  Hold metformin day before and day of    Thanks    Héctor Lucas MD  [facility] - [facility]

## 2024-02-26 NOTE — TELEPHONE ENCOUNTER
Scheduled for:  Colonoscopy [id number]  Provider Name:  Dr. Lucas   Date:  4/15/2024  Location:  [facility]  Sedation:  MAC  Time:  10:30am, (pt is aware to arrive at 9:30am)   Prep:    Meds/Allergies Reconciled?:  Physician reviewed     Diagnosis with codes:  colorectal cancer screening Z12.11 and history of adenomatous colon polyps Z86.010  Was patient informed to call insurance with codes (Y/N):  Yes, I confirmed MEDICARE insurance with this patient.      Referral sent?:  Referral was sent at the time of electronic surgical scheduling.   [facility] or [facility] notified?:  I sent an electronic request to Endo Scheduling and received a confirmation today.      Medication Orders:  Hold jardiance 4 days prior  Hold metformin day before and day of  Misc Orders:  n/a     Further instructions given by staff:   I discussed the prep instructions with the patient which she verbally understood and is aware that I will mail the instructions today.

## 2024-02-27 ENCOUNTER — TELEPHONE (OUTPATIENT)
Dept: INTERNAL MEDICINE CLINIC | Facility: CLINIC | Age: 74
End: 2024-02-27

## 2024-02-27 DIAGNOSIS — E11.49 TYPE II DIABETES MELLITUS WITH NEUROLOGICAL MANIFESTATIONS (HCC): Primary | ICD-10-CM

## 2024-02-27 RX ORDER — SODIUM, POTASSIUM,MAG SULFATES 17.5-3.13G
SOLUTION, RECONSTITUTED, ORAL ORAL
Qty: 1 EACH | Refills: 0 | Status: SHIPPED | OUTPATIENT
Start: 2024-02-27

## 2024-02-27 NOTE — TELEPHONE ENCOUNTER
Spouse of pt is calling stating that his one touch monitor stopped working and is requesting a new one, spouse did mention that pt has had it for a very long time.         Please call and advise

## 2024-02-27 NOTE — TELEPHONE ENCOUNTER
Sent    Thanks    MD Mikey Don-[facility]  2/27/2024  12:38 PM

## 2024-02-28 RX ORDER — BLOOD-GLUCOSE METER
1 KIT MISCELLANEOUS DAILY
Qty: 1 KIT | Refills: 0 | Status: SHIPPED | OUTPATIENT
Start: 2024-02-28 | End: 2025-02-27

## 2024-03-28 ENCOUNTER — HOSPITAL ENCOUNTER (OUTPATIENT)
Age: 74
Discharge: HOME | End: 2024-03-28
Payer: MEDICARE

## 2024-03-28 VITALS
RESPIRATION RATE: 20 BRPM | OXYGEN SATURATION: 99 % | HEART RATE: 78 BPM | DIASTOLIC BLOOD PRESSURE: 66 MMHG | SYSTOLIC BLOOD PRESSURE: 145 MMHG | TEMPERATURE: 98 F

## 2024-03-28 DIAGNOSIS — H65.91 RIGHT NON-SUPPURATIVE OTITIS MEDIA: Primary | ICD-10-CM

## 2024-03-28 PROCEDURE — 99214 OFFICE O/P EST MOD 30 MIN: CPT

## 2024-03-28 PROCEDURE — 99213 OFFICE O/P EST LOW 20 MIN: CPT

## 2024-03-28 RX ORDER — AMOXICILLIN AND CLAVULANATE POTASSIUM 875; 125 MG/1; MG/1
1 TABLET, FILM COATED ORAL 2 TIMES DAILY
Qty: 14 TABLET | Refills: 0 | Status: SHIPPED | OUTPATIENT
Start: 2024-03-28 | End: 2024-04-04

## 2024-03-28 NOTE — ED PROVIDER NOTES
Patient Seen in: [facility]    History   CC: ear pain  HPI: Kevin Diehl 73 year old male  who presents c/o right ear pain x4 days which sometimes is present in his right gums as well. States left ear pain began this am. Denies URI s/s, fever, rash, arabella, difficulty swallowing/drooling, cp.     Past Medical History:   Diagnosis Date    Abdominal hernia     Navel Hernia    Bleeding disorder (HCC) 2017    Chickenpox     Colon polyps 2018    tubular adenomas    Decorative tattoo     Diabetes (HCC)     Disorder of liver     FATTY LIVER    DVT (deep venous thrombosis) (HCC) 2017    Esophageal reflux     Hearing loss     Herniated intervertebral disc of lumbar spine     Measles     Mumps     Pneumonia 2014    Sinus problem        Past Surgical History:   Procedure Laterality Date    COLONOSCOPY N/A 2018    Procedure: COLONOSCOPY;  Surgeon: Jose Alejandro Boyer MD;  Location: [facility] ENDOSCOPY    COLONOSCOPY N/A 2019    Procedure: COLONOSCOPY;  Surgeon: Héctor Lucas MD;  Location: [facility] ENDOSCOPY    COLONOSCOPY  2019    HAND/FINGER SURGERY UNLISTED Right     hand surgery    HERNIA SURGERY      UMBILICAL HERNIA REPAIR         Family History   Problem Relation Age of Onset    Diabetes Father     Cancer Mother         colon cancer,  87    Clotting Disorder Mother         post surgery development of DVT    Diabetes Other         aunt    Diabetes Sister     Diabetes Other         uncle    Cancer Maternal Grandmother         unknown etiology- gender specific    Clotting Disorder Maternal Grandfather          of DVT in legs    Glaucoma Neg     Bleeding Disorders Neg     Anemia Neg        Social History     Socioeconomic History    Marital status:    Tobacco Use    Smoking status: Never    Smokeless tobacco: Never   Substance and Sexual Activity    Alcohol use: Not Currently    Drug use: No   Other Topics Concern    Caffeine Concern No   Social History Narrative    Kevin frankel   to spouse Guadalupe mcclure 43 yrs. Father of 2 adult children. He is semi-retired from [employer] home. He lives at home with his wife and adult son in [place], [place]. Originally from [place].       ROS:  Review of Systems    Positive for stated complaint: ear ache and dizziness  Other systems are as noted in HPI.  Constitutional and vital signs reviewed.      All other systems reviewed and negative except as noted above.    PSFH elements reviewed from today and agreed except as otherwise stated in HPI.             Constitutional and vital signs reviewed.        Physical Exam     ED Triage Vitals [03/28/24 1246]   /66   Pulse 78   Resp 20   Temp 97.6 °F (36.4 °C)   Temp src Temporal   SpO2 99 %   O2 Device None (Room air)       Current:/66   Pulse 78   Temp 97.6 °F (36.4 °C) (Temporal)   Resp 20   SpO2 99%         PE:  General - Appears well, non-toxic and in NAD  Head - Appears symmetrical without deformity/swelling cranium, scalp, or facial bones  Eyes - sclera not injected, no discharge noted, no periorbital edema  ENT - EAC bilaterally without discharge, RIGHT TM MILDLY injected, LEFT TM pearly grey with COL visualized appropriately  no nasal drainage noted in nares bilat, no cobblestoning to post. Pharynx.   Oropharynx clear, posterior pharynx is without erythema and without tonsilar enlargement or exudate, uvula midline, +gag, voice is clear. No trismus  Patient has no teeth.  No gumline erythema or tenderness or  Neck - no significant adenopathy, supple with trachea midline  Resp - Lung sounds clear bilaterally and wob unlabored, good aeration with equal, even expansion bilaterally   CV - RRR  Skin - no rashes or petechiae noted, pink warm and dry throughout, mmm, cap refill <2seconds  Neuro - A&O x4, steady gait  MSK - makes purposeful movements of all extremities, radial pulses 2+ bilat.  Psych - Interactive and appropriate      ED Course   Labs Reviewed - No data to display    MDM      DDx: AOM, AOE, serous otitis, dental infection    Discussed with patient treatment of for AOM which would also treat possible early dental infection with Augmentin.  Tylenol or Motrin as needed for discomfort, close follow-up with his PCP and strict return/ED precautions reviewed.  Patient is historian and demonstrates understanding of all instruction and agrees with plan of care.      Disposition and Plan     Clinical Impression:  1. Right non-suppurative otitis media        Disposition:  Discharge    Follow-up:  Sam Phillips MD  [address] [address]  [phone]    Go in 1 week  As needed      Medications Prescribed:  Discharge Medication List as of 3/28/2024 12:57 PM        START taking these medications    Details   amoxicillin clavulanate 875-125 MG Oral Tab Take 1 tablet by mouth 2 (two) times daily for 7 days., Normal, Disp-14 tablet, R-0

## 2024-04-05 ENCOUNTER — OFFICE VISIT (OUTPATIENT)
Dept: ENDOCRINOLOGY CLINIC | Facility: CLINIC | Age: 74
End: 2024-04-05

## 2024-04-05 VITALS
WEIGHT: 171 LBS | SYSTOLIC BLOOD PRESSURE: 112 MMHG | BODY MASS INDEX: 25.33 KG/M2 | RESPIRATION RATE: 18 BRPM | HEART RATE: 68 BPM | DIASTOLIC BLOOD PRESSURE: 59 MMHG | HEIGHT: 69 IN

## 2024-04-05 DIAGNOSIS — E11.65 UNCONTROLLED TYPE 2 DIABETES MELLITUS WITH HYPERGLYCEMIA (HCC): Primary | ICD-10-CM

## 2024-04-05 LAB
GLUCOSE BLOOD: 225
HEMOGLOBIN A1C: 7.7 % (ref 4.3–5.6)
TEST STRIP LOT #: NORMAL NUMERIC

## 2024-04-05 PROCEDURE — 82947 ASSAY GLUCOSE BLOOD QUANT: CPT

## 2024-04-05 PROCEDURE — 83036 HEMOGLOBIN GLYCOSYLATED A1C: CPT

## 2024-04-05 PROCEDURE — 99214 OFFICE O/P EST MOD 30 MIN: CPT

## 2024-04-05 NOTE — PROGRESS NOTES
Name: Kevin Diehl  Date: 4/5/24    Referring Physician: No ref. provider found    HISTORY OF PRESENT ILLNESS   Kevin Diehl is a 73 year old male who presents for follow up for diabetes mellitus.     Diabetes History:  Diagnosed- in early 20's   Patient has not had hospitalizations for blood sugar issues    Prior glycohemoglobin were: 11.4% 11/2023; 9.3% 2/2024; 7.7% POC today   Glucose in clinic today - 225 mg/dl     Dietary compliance: Good- eating softer foods recently - no teeth. Admits was drinking a lot of soda in recent months and eating higher CHO foods- juice recently but has been working on decreasing intake of sweetened beverages.      Recall: some cheating with candy over Easter holiday     Breakfast- oatmeal or eggs with juice or milk   Lunch- sandwich   Dinner- protein with potato, or sausage with spaghetti   Snack- sometimes eats more of dinner meal between dinner and bedtime or sandwich   Beverages- unsweetened tea      Exercise: - None recently     Polyuria/polydipsia: No  Blurred vision: No s/p cataract surgery in one eye- planning for surgery in the other eye.     Episodes of hypoglycemia: No  Blood Glucose:    Fasting- 140-150's     Current DM Regimen:  Metformin 1000 mg PO BID - patient stopped for fear of hypoglycemia but was not instructed to stop.   Jardiance 25mg daily - adherent with medication.    Glimepiride 4mg --> stopped since last visit due to hypoglycemia.       Modifying factors:  Medication adherence: Yes  Recent steroids, illness or infections: No      REVIEW OF SYSTEMS  Eyes: Diabetic retinopathy present: No            Most recent visit to eye doctor in last 12 months: Yes- s/p cataract surgery. Was last seen 11/2023    CV: Cardiovascular disease present: No         Hypertension present: No         Hyperlipidemia present: No         Peripheral Vascular Disease present: No    : Nephropathy present: No    Neuro: Neuropathy present: Yes- tingling in feet     Skin: Infection  or ulceration: No    Osteoporosis: No    Thyroid disease: No      Medications:     Current Outpatient Medications:     Empagliflozin 25 MG Oral Tab, Take by mouth., Disp: , Rfl:     Blood Glucose Monitoring Suppl (ONETOUCH VERIO IQ SYSTEM) w/Device Does not apply Kit, 1 Device by Other route daily. Use as directed. (Patient not taking: Reported on 4/5/2024), Disp: 1 kit, Rfl: 0    Na Sulfate-K Sulfate-Mg Sulf (SUPREP BOWEL PREP KIT) 17.5-3.13-1.6 GM/177ML Oral Solution, Take as directed (Patient not taking: Reported on 4/5/2024), Disp: 1 each, Rfl: 0    glimepiride 2 MG Oral Tab, Take 2 tablets (4 mg total) by mouth in the morning and 2 tablets (4 mg total) before bedtime. (Patient not taking: Reported on 4/5/2024), Disp: 360 tablet, Rfl: 1    metFORMIN 500 MG Oral Tab, Take 2 tablets (1,000 mg total) by mouth 2 (two) times daily with meals. (Patient not taking: Reported on 4/5/2024), Disp: 360 tablet, Rfl: 1    Glucose Blood (ONETOUCH ULTRA BLUE) In Vitro Strip, Test blood sugar daily Dx E11.9 (Patient not taking: Reported on 4/5/2024), Disp: 50 strip, Rfl: 5     Allergies:   No Known Allergies    Social History:   Social History     Socioeconomic History    Marital status:    Tobacco Use    Smoking status: Never    Smokeless tobacco: Never   Substance and Sexual Activity    Alcohol use: Not Currently    Drug use: No   Other Topics Concern    Caffeine Concern No       Medical History:   Past Medical History:   Diagnosis Date    Abdominal hernia     Navel Hernia    Bleeding disorder (HCC) 2017    Chickenpox     Colon polyps 04/2018    tubular adenomas    Decorative tattoo     Diabetes (HCC)     Disorder of liver     FATTY LIVER    DVT (deep venous thrombosis) ([facility]) 04/2017    Esophageal reflux     Hearing loss     Herniated intervertebral disc of lumbar spine     Measles     Mumps     Pneumonia 12/25/2014    Sinus problem        Surgical history:   Past Surgical History:   Procedure Laterality Date     COLONOSCOPY N/A 4/20/2018    Procedure: COLONOSCOPY;  Surgeon: Jose Aleajndro Boyer MD;  Location: [facility] ENDOSCOPY    COLONOSCOPY N/A 4/16/2019    Procedure: COLONOSCOPY;  Surgeon: Héctor Lucas MD;  Location: [facility] ENDOSCOPY    COLONOSCOPY  12/11/2019    HAND/FINGER SURGERY UNLISTED Right     hand surgery    HERNIA SURGERY      UMBILICAL HERNIA REPAIR         PHYSICAL EXAM  Vitals:    04/05/24 1035 04/05/24 1058   BP: 152/61 112/59   Pulse: 67 68   Resp: 18    Weight: 171 lb (77.6 kg)    Height: 5' 9\" (1.753 m)          General Appearance:  alert, well developed, in no acute distress  Eyes:  normal conjunctivae, sclera, and normal pupils  Neck: Trachea midline: Normal  Back: no kyphosis or back tenderness  Lymph Nodes:  No abnormal nodes noted  Musculoskeletal:  normal muscle strength and tone  Skin:  normal moisture and skin texture  Hair & Nails:  normal scalp hair     Hematologic:  no excessive bruising  Neuro:  sensory grossly intact and motor grossly intact.  Psychiatric:  oriented to time, self, and place  Nutritional:  no abnormal weight gain or loss      ASSESSMENT/PLAN:    Diabetes mellitus type 2 uncontrolled  -Hga1c- 7.7%- significantly improved   -Reviewed ABC's of diabetes    - Reviewed pathogenesis of diabetes.   - Reviewed importance of good glycemic control to prevent microvascular and macrovascular complications including nephropathy, neuropathy, retinopathy, and cardiovascular disease.  - Reviewed importance of SBGM- check glucose 1-2 times daily   - Reviewed target glucose goals for patient   fasting and <180 post prandially   - Reviewed importance of following diabetic diet- recommended 135 grams of CHO per day or 45 grams per meal.   - Provided patient education materials    - Interested in personal CGM but I did discuss Medicare will only cover for patients on insulin. Reviewed option for cash pay.     - He did have hypoglycemia after addition of Jardiance- was instructed to stop  Glimepiride but he stopped both Glimepiride and metformin.     -Restart Metformin 1000mg once daily. Reviewed side effects and risks vs benefits of medication    -ContinueJardiance 25mg PO daily. Reviewed side effects and risks vs benefits of medication. Reviewed importance of staying well hydrated on medication.     -Check glucose twice daily, reviewed glucose targets       -Lipids 11/2023- LDL- 66. Reviewed recommendation for statin medication. He tolerated atorvastatin 40mg in the past. He was not willing to restart medication today but open to discussing again at next visit. Reviewed cardiac protective benefit of statin at length   - no nephropathy   - normal foot exam 11/2023  - UTD with optho- last visit 12/2023  - Reviewed daily foot care and foot exam.     RTC in 3 months  4/5/24  FREDRICK England    A total of 35 minutes was spent on obtaining history, reviewing pertinent imaging/labs and specialists notes, evaluating patient, providing multiple treatment options, reinforcing diet/exercise and compliance, and completing documentation.

## 2024-04-11 NOTE — PAT NURSING NOTE
Spoke to patient's wife today regarding procedure scheduled 4/15. This RN reviewed the patient's current medication list.  Patient is prescribed jardiance. Per anesthesia protocol, patient is to hold these medications 4 prior to procedure. Patient's wife verbalized understanding.

## 2024-04-15 ENCOUNTER — ANESTHESIA EVENT (OUTPATIENT)
Dept: ENDOSCOPY | Facility: HOSPITAL | Age: 74
End: 2024-04-15
Payer: MEDICARE

## 2024-04-15 ENCOUNTER — HOSPITAL ENCOUNTER (OUTPATIENT)
Facility: HOSPITAL | Age: 74
Setting detail: HOSPITAL OUTPATIENT SURGERY
Discharge: HOME | End: 2024-04-15
Attending: INTERNAL MEDICINE | Admitting: INTERNAL MEDICINE
Payer: MEDICARE

## 2024-04-15 ENCOUNTER — ANESTHESIA (OUTPATIENT)
Dept: ENDOSCOPY | Facility: HOSPITAL | Age: 74
End: 2024-04-15
Payer: MEDICARE

## 2024-04-15 VITALS
DIASTOLIC BLOOD PRESSURE: 70 MMHG | WEIGHT: 170 LBS | TEMPERATURE: 98 F | OXYGEN SATURATION: 96 % | HEIGHT: 69 IN | SYSTOLIC BLOOD PRESSURE: 118 MMHG | RESPIRATION RATE: 17 BRPM | BODY MASS INDEX: 25.18 KG/M2 | HEART RATE: 57 BPM

## 2024-04-15 DIAGNOSIS — Z12.11 SPECIAL SCREENING FOR MALIGNANT NEOPLASMS, COLON: ICD-10-CM

## 2024-04-15 DIAGNOSIS — Z86.010 PERSONAL HISTORY OF COLONIC POLYPS: ICD-10-CM

## 2024-04-15 PROBLEM — Z86.0101 ENCOUNTER FOR COLONOSCOPY DUE TO HISTORY OF ADENOMATOUS COLONIC POLYPS: Status: ACTIVE | Noted: 2024-04-15

## 2024-04-15 LAB — GLUCOSE BLDC GLUCOMTR-MCNC: 145 MG/DL (ref 70–99)

## 2024-04-15 PROCEDURE — 45385 COLONOSCOPY W/LESION REMOVAL: CPT | Performed by: INTERNAL MEDICINE

## 2024-04-15 PROCEDURE — 0DBN8ZX EXCISION OF SIGMOID COLON, VIA NATURAL OR ARTIFICIAL OPENING ENDOSCOPIC, DIAGNOSTIC: ICD-10-PCS | Performed by: INTERNAL MEDICINE

## 2024-04-15 PROCEDURE — 0DBH8ZX EXCISION OF CECUM, VIA NATURAL OR ARTIFICIAL OPENING ENDOSCOPIC, DIAGNOSTIC: ICD-10-PCS | Performed by: INTERNAL MEDICINE

## 2024-04-15 PROCEDURE — 0DBL8ZX EXCISION OF TRANSVERSE COLON, VIA NATURAL OR ARTIFICIAL OPENING ENDOSCOPIC, DIAGNOSTIC: ICD-10-PCS | Performed by: INTERNAL MEDICINE

## 2024-04-15 PROCEDURE — 45380 COLONOSCOPY AND BIOPSY: CPT | Performed by: INTERNAL MEDICINE

## 2024-04-15 RX ORDER — SODIUM CHLORIDE, SODIUM LACTATE, POTASSIUM CHLORIDE, CALCIUM CHLORIDE 600; 310; 30; 20 MG/100ML; MG/100ML; MG/100ML; MG/100ML
INJECTION, SOLUTION INTRAVENOUS CONTINUOUS
Status: DISCONTINUED | OUTPATIENT
Start: 2024-04-15 | End: 2024-04-15

## 2024-04-15 RX ORDER — LIDOCAINE HYDROCHLORIDE 10 MG/ML
INJECTION, SOLUTION EPIDURAL; INFILTRATION; INTRACAUDAL; PERINEURAL AS NEEDED
Status: DISCONTINUED | OUTPATIENT
Start: 2024-04-15 | End: 2024-04-15 | Stop reason: SURG

## 2024-04-15 RX ADMIN — LIDOCAINE HYDROCHLORIDE 25 MG: 10 INJECTION, SOLUTION EPIDURAL; INFILTRATION; INTRACAUDAL; PERINEURAL at 10:55:00

## 2024-04-15 RX ADMIN — SODIUM CHLORIDE, SODIUM LACTATE, POTASSIUM CHLORIDE, CALCIUM CHLORIDE: 600; 310; 30; 20 INJECTION, SOLUTION INTRAVENOUS at 11:16:00

## 2024-04-15 NOTE — DISCHARGE INSTRUCTIONS
Home Care Instructions for Colonoscopy  with Sedation    Diet:  - Resume your regular diet as tolerated unless otherwise instructed.  - Start with light meals to minimize bloating.  - Do not drink alcohol today.    Medication:  - If you have questions about resuming your normal medications, please contact your Primary Care Physician.    Activities:  - Take it easy today. Do not return to work today.  - Do not drive today.  - Do not operate any machinery today (including kitchen equipment).    Colonoscopy:  - You may notice some rectal \"spotting\" (a little blood on the toilet tissue) for a day or two after the exam. This is normal.  - If you experience any rectal bleeding (not spotting), persistent tenderness or sharp severe abdominal pains, oral temperature over 100 degrees Fahrenheit, light-headedness or dizziness, or any other problems, contact your doctor.        **If unable to reach your doctor, please go to the [facility] Emergency Room**    - Your referring physician will receive a full report of your examination.  - If you do not hear from your doctor's office within two weeks of your biopsy, please call them for your results.    You may be able to see your laboratory results in netomat between 4 and 7 business days.  In some cases, your physician may not have viewed the results before they are released to netomat.  If you have questions regarding your results contact the physician who ordered the test/exam by phone or via netomat by choosing \"Ask a Medical Question.\"

## 2024-04-15 NOTE — ANESTHESIA POSTPROCEDURE EVALUATION
Patient: Kevin Diehl    Procedure Summary       Date: 04/15/24 Room / Location: [facility] ENDOSCOPY 01 / EM ENDOSCOPY    Anesthesia Start: 1052 Anesthesia Stop: 1116    Procedure: COLONOSCOPY Diagnosis:       Special screening for malignant neoplasms, colon      Personal history of colonic polyps      (colon polyps, hemorrhoids)    Surgeons: Héctor Lucas MD Anesthesiologist: Seema Lockhart CRNA    Anesthesia Type: general ASA Status: 2            Anesthesia Type: general    Vitals Value Taken Time   BP 95/60 04/15/24 1116   Temp 98 °F (36.7 °C) 04/15/24 1116   Pulse 57 04/15/24 1116   Resp 20 04/15/24 1116   SpO2 99 % 04/15/24 [facility]6       [facility] AN Post Evaluation:   Patient Evaluated in PACU  Patient Participation: complete - patient participated  Level of Consciousness: sleepy but conscious  Pain Score: 0  Pain Management: adequate  Airway Patency:patent  Dental exam unchanged from preop  Yes    Nausea/Vomiting: none  Cardiovascular Status: acceptable  Respiratory Status: acceptable  Postoperative Hydration acceptable      Seema Lockhart CRNA  4/15/2024 11:16 AM

## 2024-04-15 NOTE — OPERATIVE REPORT
Colonoscopy Report    Kevin Diehl     1950 Age 73 year old   PCP Sam Phillips MD Endoscopist Héctor Lucas MD     Date of procedure: 04/15/24    Procedure: Colonoscopy w/ biopsy and snare polypectomy    Pre-operative diagnosis: history of adenomatous colon polyps     History of colonoscopy in 2018 with Dr. Boyer.  Was found at that time to have a larger polyp located at the ileocecal valve.  Patient was referred for EMR.  He underwent colonoscopy with endoscopic mucosal resection of 2019 with a 2 cm ileocecal valve polyp.  Repeat colonoscopy in 2019 found to have 2 small polyps residually at that area which were removed.  Was recommended to have a surveillance colonoscopy in 6 months however appears you may have been lost to follow-up.  Returns today for surveillance colonoscopy exam.     Post-operative diagnosis: see impression    Sedation: monitored anesthesia care (MAC)    Consent: We discussed the risks/benefits and alternatives to this procedure, as well as the planned sedation. Informed consent was obtained from the patient after the risks of the procedure were discussed, including but not limited to bleeding, perforation, aspiration, infection, or possibility of a missed lesion as well as the risks of anesthesia including but not limited to cardiopulmonary complications. The patient signed informed consent and elected to proceed with Colonoscopy with intervention [i.e. Biopsy, control of bleeding, dilatation, polypectomy, endoscopic mucosal resection, etc.] as indicated.    Colonoscopy procedure: Once an adequate level of sedation was obtained a digital rectal exam was completed revealing normal tone and no masses palpated. Then the lubricated tip of the [id number] diagnostic video colonoscope fitted with a clear cap was carefully inserted and advanced without difficulty to the cecum using the air insufflation technique (only Co2 was used for insufflation). The  cecum was identified by localizing the trifold, the appendix and the ileocecal valve. Withdrawal was begun with thorough washing and careful examination of the colonic walls and folds. The ascending colon was viewed twice in the forward view. Photodocumentation was obtained. The bowel prep was good. Views of the colon were good with washing. Withdrawal time was 14 minutes.    Air was then withdrawn and the endoscope was removed. The patient tolerated the procedure well. There were no immediate postoperative complications. The patient’s vital signs were monitored throughout the procedure and remained stable.    Estimated blood loss: insignificant    Specimens collected:  colon polyps    Complications: none     Colonoscopy findings:  Terminal ileum: normal mucosa    Cecum: over the ileocecal valve was a 6-7 mm polyp which was removed by cold snare polypectomy. Otherwise, normal mucosa and vascular pattern    Ascending colon: normal mucosa and vascular pattern    Transverse colon: there was a 3 mm polyp and a 5-6 mm polyp and each removed by cold snare polypectomy. Otherwise, normal mucosa and vascular pattern    Descending colon: normal mucosa and vascular pattern    Sigmoid colon: there was a 2-3 mm polyp removed by cold biopsy forceps. Otherwise, normal mucosa and vascular pattern    Rectum: retroflexed view showed small non-bleeding hemorrhoids. Otherwise, normal mucosa and vascular pattern.    Impression:  1. 4 polyps removed  2. Small hemorrhoids  3. Otherwise, unremarkable colonoscopy    Recommend:  1. Await pathology.   2. Repeat colonoscopy interval pending final pathology results. If new signs or symptoms develop, procedure may need to be repeated sooner.   3. Continue your current medications  4. Follow up with your primary care physician on a routine basis    >>>If biopsies were performed and you have not received your pathology results either by phone or letter within 2 weeks, please call our office at  [phone].    MD Mikey [facility] - Gastroenterology  4/15/2024

## 2024-04-15 NOTE — ANESTHESIA PREPROCEDURE EVALUATION
Anesthesia PreOp Note    HPI:     Kevin Diehl is a 73 year old male who presents for preoperative consultation requested by: Héctor Lucas MD    Date of Surgery: 4/15/2024    Procedure(s):  COLONOSCOPY  Indication: Special screening for malignant neoplasms, colon   Personal history of colonic polyps    Relevant Problems   No relevant active problems       NPO:  Last Liquid Consumption Date: 04/15/24  Last Liquid Consumption Time: 0630  Last Solid Consumption Date: 04/14/24  Last Solid Consumption Time: 0700  Last Liquid Consumption Date: 04/15/24          History Review:  Patient Active Problem List    Diagnosis Date Noted    Adenomatous polyp of colon     Other cirrhosis of liver (HCC) 03/11/2019    Chronic deep vein thrombosis (DVT) of femoral vein of left lower extremity (HCC) 03/11/2019    Protein C deficiency (HCC) 03/11/2019    Antiphospholipid antibody with hypercoagulable state (HCC) 03/11/2019    Atherosclerosis of abdominal aorta (HCC) 03/11/2019    Age-related nuclear cataract of both eyes 06/18/2018    Hyperopia with astigmatism and presbyopia, bilateral 12/14/2015    Senile cataract 10/25/2012    Allergic rhinitis 01/06/2009    Calcaneal spur 08/27/2007    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) 07/16/2007    Arthropathy of ankle and foot 07/06/2007       Past Medical History:    Abdominal hernia    Navel Hernia    Bleeding disorder (HCC)    Chickenpox    Colon polyps    tubular adenomas    Decorative tattoo    Diabetes (HCC)    Disorder of liver    FATTY LIVER    DVT (deep venous thrombosis) (HCC)    Esophageal reflux    Hearing loss    Herniated intervertebral disc of lumbar spine    Measles    Mumps    Pneumonia    Sinus problem       Past Surgical History:   Procedure Laterality Date    Colonoscopy N/A 4/20/2018    Procedure: COLONOSCOPY;  Surgeon: Jose Alejandro Boyer MD;  Location: [facility] ENDOSCOPY    Colonoscopy N/A 4/16/2019    Procedure: COLONOSCOPY;  Surgeon:  Héctor Lucas MD;  Location: [facility] ENDOSCOPY    Colonoscopy  2019    Hand/finger surgery unlisted Right     hand surgery    Hernia surgery      Umbilical hernia repair         Medications Prior to Admission   Medication Sig Dispense Refill Last Dose    Empagliflozin 25 MG Oral Tab Take by mouth.   4/10/2024    metFORMIN 500 MG Oral Tab Take 2 tablets (1,000 mg total) by mouth 2 (two) times daily with meals. 360 tablet 1 4/10/2024    [] amoxicillin clavulanate 875-125 MG Oral Tab Take 1 tablet by mouth 2 (two) times daily for 7 days. (Patient not taking: Reported on 2024) 14 tablet 0 Not Taking    Blood Glucose Monitoring Suppl (ONETOUCH VERIO IQ SYSTEM) w/Device Does not apply Kit 1 Device by Other route daily. Use as directed. (Patient not taking: Reported on 2024) 1 kit 0     Na Sulfate-K Sulfate-Mg Sulf (SUPREP BOWEL PREP KIT) 17.5-3.13-1.6 GM/177ML Oral Solution Take as directed (Patient not taking: Reported on 2024) 1 each 0     [] Empagliflozin (JARDIANCE) 25 MG Oral Tab Take 1 tablet by mouth daily. 90 tablet 1     glimepiride 2 MG Oral Tab Take 2 tablets (4 mg total) by mouth in the morning and 2 tablets (4 mg total) before bedtime. (Patient not taking: Reported on 2024) 360 tablet 1     Glucose Blood (ONETOUCH ULTRA BLUE) In Vitro Strip Test blood sugar daily Dx E11.9 (Patient not taking: Reported on 2024) 50 strip 5      Current Facility-Administered Medications Ordered in Epic   Medication Dose Route Frequency Provider Last Rate Last Admin    lactated ringers infusion   Intravenous Continuous Héctor Luacs MD 20 mL/hr at 04/15/24 0950 New Bag at 04/15/24 0950     No current [facility]-ordered outpatient medications on file.       No Known Allergies    Family History   Problem Relation Age of Onset    Diabetes Father     Cancer Mother         colon cancer,  87    Clotting Disorder Mother         post surgery development of DVT    Diabetes Other         aunt     Diabetes Sister     Diabetes Other         uncle    Cancer Maternal Grandmother         unknown etiology- gender specific    Clotting Disorder Maternal Grandfather          of DVT in legs    Glaucoma Neg     Bleeding Disorders Neg     Anemia Neg      Social History     Socioeconomic History    Marital status:    Tobacco Use    Smoking status: Never    Smokeless tobacco: Never   Substance and Sexual Activity    Alcohol use: Not Currently    Drug use: No   Other Topics Concern    Caffeine Concern No       Available pre-op labs reviewed.     Lab Results   Component Value Date    PGLU 145 (H) 04/15/2024          Vital Signs:  Body mass index is 25.1 kg/m².   height is 1.753 m (5' 9\") and weight is 77.1 kg (170 lb). His blood pressure is 130/68 and his pulse is 63. His respiration is 15 and oxygen saturation is 97%.   Vitals:    24 1258 04/15/24 0943   BP:  130/68   Pulse:  63   Resp:  15   SpO2:  97%   Weight: 77.1 kg (170 lb) 77.1 kg (170 lb)   Height: 1.753 m (5' 9\") 1.753 m (5' 9\")        Anesthesia Evaluation     Patient summary reviewed and Nursing notes reviewed    No history of anesthetic complications   Airway   Mallampati: II  TM distance: >3 FB  Neck ROM: full  Dental    (+) lower dentures and upper dentures    Pulmonary - negative ROS and normal exam   Cardiovascular - negative ROS and normal exam  Exercise tolerance: good    Neuro/Psych - negative ROS     GI/Hepatic/Renal    (+) GERD, liver disease    Endo/Other    (+) diabetes mellitus type 2 well controlled  Abdominal  - normal exam                 Anesthesia Plan:   ASA:  2  Plan:   General  Informed Consent Plan and Risks Discussed With:  Patient  Discussed plan with:  CRNA and surgeon      I have informed Kevin Diehl and/or legal guardian or family member of the nature of the anesthetic plan, benefits, risks including possible dental damage if relevant, major complications, and any alternative forms of anesthetic management.   All  of the patient's questions were answered to the best of my ability. The patient desires the anesthetic management as planned.  Seema Lockhart CRNA  4/15/2024 10:34 AM  Present on Admission:  **None**

## 2024-04-15 NOTE — H&P
History & Physical Examination    Patient Name: Kevin Diehl  MRN: [id number]  CSN: [id number]  YOB: 1950    Diagnosis: history of adenomatous colon polyps    History of colonoscopy in 2018 with Dr. Boyer.  Was found at that time to have a larger polyp located at the ileocecal valve.  Patient was referred for EMR.  He underwent colonoscopy with endoscopic mucosal resection of 2019 with a 2 cm ileocecal valve polyp.  Repeat colonoscopy in 2019 found to have 2 small polyps residually at that area which were removed.  Was recommended to have a surveillance colonoscopy in 6 months however appears you may have been lost to follow-up.  Returns today for surveillance colonoscopy exam.    Medications Prior to Admission   Medication Sig Dispense Refill Last Dose    Empagliflozin 25 MG Oral Tab Take by mouth.   4/10/2024    metFORMIN 500 MG Oral Tab Take 2 tablets (1,000 mg total) by mouth 2 (two) times daily with meals. 360 tablet 1 4/10/2024    [] amoxicillin clavulanate 875-125 MG Oral Tab Take 1 tablet by mouth 2 (two) times daily for 7 days. (Patient not taking: Reported on 2024) 14 tablet 0 Not Taking    Blood Glucose Monitoring Suppl (ONETOUCH VERIO IQ SYSTEM) w/Device Does not apply Kit 1 Device by Other route daily. Use as directed. (Patient not taking: Reported on 2024) 1 kit 0     Na Sulfate-K Sulfate-Mg Sulf (SUPREP BOWEL PREP KIT) 17.5-3.13-1.6 GM/177ML Oral Solution Take as directed (Patient not taking: Reported on 2024) 1 each 0     [] Empagliflozin (JARDIANCE) 25 MG Oral Tab Take 1 tablet by mouth daily. 90 tablet 1     glimepiride 2 MG Oral Tab Take 2 tablets (4 mg total) by mouth in the morning and 2 tablets (4 mg total) before bedtime. (Patient not taking: Reported on 2024) 360 tablet 1     Glucose Blood (ONETOUCH ULTRA BLUE) In Vitro Strip Test blood sugar daily Dx E11.9 (Patient not taking: Reported on 2024) 50 strip 5      Current  Facility-Administered Medications   Medication Dose Route Frequency    lactated ringers infusion   Intravenous Continuous       Allergies: No Known Allergies    Past Medical History:    Abdominal hernia    Navel Hernia    Bleeding disorder (HCC)    Chickenpox    Colon polyps    tubular adenomas    Decorative tattoo    Diabetes (HCC)    Disorder of liver    FATTY LIVER    DVT (deep venous thrombosis) (HCC)    Esophageal reflux    Hearing loss    Herniated intervertebral disc of lumbar spine    Measles    Mumps    Pneumonia    Sinus problem     Past Surgical History:   Procedure Laterality Date    Colonoscopy N/A 2018    Procedure: COLONOSCOPY;  Surgeon: Jose Alejandro Boyer MD;  Location: [facility] ENDOSCOPY    Colonoscopy N/A 2019    Procedure: COLONOSCOPY;  Surgeon: Héctor Lucas MD;  Location: [facility] ENDOSCOPY    Colonoscopy  2019    Hand/finger surgery unlisted Right     hand surgery    Hernia surgery      Umbilical hernia repair       Family History   Problem Relation Age of Onset    Diabetes Father     Cancer Mother         colon cancer,  87    Clotting Disorder Mother         post surgery development of DVT    Diabetes Other         aunt    Diabetes Sister     Diabetes Other         uncle    Cancer Maternal Grandmother         unknown etiology- gender specific    Clotting Disorder Maternal Grandfather          of DVT in legs    Glaucoma Neg     Bleeding Disorders Neg     Anemia Neg      Social History     Tobacco Use    Smoking status: Never    Smokeless tobacco: Never   Substance Use Topics    Alcohol use: Not Currently       SYSTEM Check if Physical Exam is Normal If not normal, please explain:   HEENT [ X]    NECK  [ X]    HEART [ X]    LUNGS [ X]    ABDOMEN [ X]    EXTREMITIES [ X]      General:awake, cooperative, no acute distress  HEENT: EOMI, no scleral icterus, MMM; oral pharnyx is without exudates or lesions  Neck: no lymphadenopathy; thyroid is not enlarged and without nodules  CV:  RRR  Resp: non-labored breathing  Abd: soft, non-tender, non-distended  Ext: no lower extremity swelling  Neuro: Alert, Oriented X 3  Skin: no rashes, bruises  Psych: normal affect  I have discussed the risks and benefits and alternatives of the procedure with the patient/family.  They understand and agreed to proceed with plan of care.   Colonoscopy consent: I have discussed the risks, benefits, and alternatives (including stool testing) to colonoscopy with the patient [who demonstrated understanding], including but not limited to the risks of bleeding, infection, pain, as well as the risks of anesthesia and perforation all leading to prolonged hospitalization, surgical intervention, or could be life threatening. I also specifically mentioned the risk of missed lesions/polyps. All questions were answered to the patient’s satisfaction. The patient signed informed consent and elected to proceed with colonoscopy with intervention [i.e. polypectomy, biopsy, control of bleeding, etc.] as indicated. I also discussed a ride home from a family member of friend is required and driving after the procedure with sedation is not safe or recommended.  Héctor Lucas MD  [facility] - Gastroenterology  4/15/2024  10:45 AM

## 2024-04-16 NOTE — PROGRESS NOTES
GI staff: please place recall for colonoscopy in 3 years     Thanks    Héctor Lucas MD  [facility] - Gastroenterology  4/16/2024  9:44 AM

## 2024-04-17 ENCOUNTER — TELEPHONE (OUTPATIENT)
Facility: CLINIC | Age: 74
End: 2024-04-17

## 2024-04-17 NOTE — TELEPHONE ENCOUNTER
----- Message from Héctor Lucas MD sent at 4/16/2024  9:44 AM CDT -----  GI staff: please place recall for colonoscopy in 3 years     Thanks    Héctor Lucas MD  [facility] - Gastroenterology  4/16/2024  9:44 AM

## 2024-04-17 NOTE — TELEPHONE ENCOUNTER
Health maintenance updated. Last colonoscopy done 4/15/24. 3 year recall placed into Pt Outreach, next due on 04/2027 per Dr. Lucas.

## 2024-07-11 ENCOUNTER — OFFICE VISIT (OUTPATIENT)
Dept: ENDOCRINOLOGY CLINIC | Facility: CLINIC | Age: 74
End: 2024-07-11

## 2024-07-11 VITALS
BODY MASS INDEX: 25.03 KG/M2 | HEART RATE: 65 BPM | SYSTOLIC BLOOD PRESSURE: 133 MMHG | DIASTOLIC BLOOD PRESSURE: 68 MMHG | HEIGHT: 69 IN | WEIGHT: 169 LBS

## 2024-07-11 DIAGNOSIS — E11.65 UNCONTROLLED TYPE 2 DIABETES MELLITUS WITH HYPERGLYCEMIA (HCC): Primary | ICD-10-CM

## 2024-07-11 LAB
GLUCOSE BLOOD: 193
HEMOGLOBIN A1C: 8.5 % (ref 4.3–5.6)
TEST STRIP LOT #: NORMAL NUMERIC

## 2024-07-11 PROCEDURE — 82947 ASSAY GLUCOSE BLOOD QUANT: CPT

## 2024-07-11 PROCEDURE — 99214 OFFICE O/P EST MOD 30 MIN: CPT

## 2024-07-11 PROCEDURE — 83036 HEMOGLOBIN GLYCOSYLATED A1C: CPT

## 2024-07-11 RX ORDER — EMPAGLIFLOZIN 25 MG/1
25 TABLET, FILM COATED ORAL DAILY
Qty: 30 TABLET | Refills: 3 | Status: SHIPPED | OUTPATIENT
Start: 2024-07-11

## 2024-07-11 RX ORDER — GLIPIZIDE 5 MG/1
5 TABLET ORAL
Qty: 30 TABLET | Refills: 3 | Status: SHIPPED | OUTPATIENT
Start: 2024-07-11

## 2024-07-11 NOTE — PROGRESS NOTES
Name: Kevin Diehl  Date: 7/11/24    Referring Physician: No ref. provider found    HISTORY OF PRESENT ILLNESS   Kevin Diehl is a 74 year old male who presents for follow up for diabetes mellitus.     Diabetes History:  Diagnosed- in early 20's   Patient has not had hospitalizations for blood sugar issues    Prior glycohemoglobin were: 11.4% 11/2023; 9.3% 2/2024; 7.7% 4/2024; 8.5% POC today     Glucose in clinic today - 193 mg/dl     Dietary compliance:Poor- having more regular juice and soda, poor adherence to low CHO diet.      Recall:     Breakfast- oatmeal or eggs with juice or milk   Lunch- sandwich   Dinner- protein with potato, or sausage with spaghetti   Snack- sometimes eats more of dinner meal between dinner and bedtime or sandwich   Beverages- unsweetened tea      Exercise: - None recently   Polyuria/polydipsia: No  Blurred vision: No s/p cataract surgery in one eye- planning for surgery in the other eye.     Episodes of hypoglycemia: No  Blood Glucose:    - States he is checking but infrequently- could not recall any sugars during OV.     Current DM Regimen:  Metformin 1000 mg PO daily in the evening   Jardiance 25mg daily - out of medication x 1 week.     Modifying factors:  Medication adherence: Yes  Recent steroids, illness or infections: No      REVIEW OF SYSTEMS  Eyes: Diabetic retinopathy present: No            Most recent visit to eye doctor in last 12 months: Yes- s/p cataract surgery. Was last seen 11/2023    CV: Cardiovascular disease present: No         Hypertension present: No         Hyperlipidemia present: No         Peripheral Vascular Disease present: No    : Nephropathy present: No    Neuro: Neuropathy present: Yes- tingling in feet     Skin: Infection or ulceration: No    Osteoporosis: No    Thyroid disease: No      Medications:     Current Outpatient Medications:     Empagliflozin 25 MG Oral Tab, Take by mouth., Disp: , Rfl:     Blood Glucose Monitoring Suppl (ONETOUCH VERIO IQ  SYSTEM) w/Device Does not apply Kit, 1 Device by Other route daily. Use as directed., Disp: 1 kit, Rfl: 0    metFORMIN 500 MG Oral Tab, Take 2 tablets (1,000 mg total) by mouth 2 (two) times daily with meals., Disp: 360 tablet, Rfl: 1    Glucose Blood (ONETOUCH ULTRA BLUE) In Vitro Strip, Test blood sugar daily Dx E11.9, Disp: 50 strip, Rfl: 5    Na Sulfate-K Sulfate-Mg Sulf (SUPREP BOWEL PREP KIT) 17.5-3.13-1.6 GM/177ML Oral Solution, Take as directed, Disp: 1 each, Rfl: 0    glimepiride 2 MG Oral Tab, Take 2 tablets (4 mg total) by mouth in the morning and 2 tablets (4 mg total) before bedtime. (Patient not taking: Reported on 7/11/2024), Disp: 360 tablet, Rfl: 1     Allergies:   No Known Allergies    Social History:   Social History     Socioeconomic History    Marital status:    Tobacco Use    Smoking status: Never    Smokeless tobacco: Never   Substance and Sexual Activity    Alcohol use: Not Currently    Drug use: No   Other Topics Concern    Caffeine Concern No       Medical History:   Past Medical History:    Abdominal hernia    Navel Hernia    Bleeding disorder (HCC)    Chickenpox    Colon adenomas    x3    Colon polyps    tubular adenomas    Decorative tattoo    Diabetes (HCC)    Disorder of liver    FATTY LIVER    DVT (deep venous thrombosis) (HCC)    Esophageal reflux    Hearing loss    Herniated intervertebral disc of lumbar spine    Measles    Mumps    Pneumonia    Sinus problem       Surgical history:   Past Surgical History:   Procedure Laterality Date    Colonoscopy N/A 04/20/2018    Procedure: COLONOSCOPY;  Surgeon: Jose Alejandro Boyer MD;  Location: [facility] ENDOSCOPY    Colonoscopy N/A 04/16/2019    Procedure: COLONOSCOPY;  Surgeon: Héctor Lucas MD;  Location: [facility] ENDOSCOPY    Colonoscopy  12/11/2019    Colonoscopy  04/15/2024    Dr. Lucas; colon polyps, hemorrhoids    Colonoscopy N/A 4/15/2024    Procedure: COLONOSCOPY;  Surgeon: Héctor Lucas MD;  Location: [facility] ENDOSCOPY     Hand/finger surgery unlisted Right     hand surgery    Hernia surgery      Umbilical hernia repair         PHYSICAL EXAM  Vitals:    07/11/24 1331   BP: 133/68   Pulse: 65   Weight: 169 lb (76.7 kg)   Height: 5' 9\" (1.753 m)         General Appearance:  alert, well developed, in no acute distress  Eyes:  normal conjunctivae, sclera, and normal pupils  Neck: Trachea midline: Normal  Back: no kyphosis or back tenderness  Lymph Nodes:  No abnormal nodes noted  Musculoskeletal:  normal muscle strength and tone  Skin:  normal moisture and skin texture  Hair & Nails:  normal scalp hair     Hematologic:  no excessive bruising  Neuro:  sensory grossly intact and motor grossly intact.  Psychiatric:  oriented to time, self, and place  Nutritional:  no abnormal weight gain or loss      ASSESSMENT/PLAN:    Diabetes mellitus type 2 uncontrolled  -Hga1c- 8.5% POC today   -Reviewed ABC's of diabetes    - Reviewed pathogenesis of diabetes.   - Reviewed importance of good glycemic control to prevent microvascular and macrovascular complications including nephropathy, neuropathy, retinopathy, and cardiovascular disease.  - Reviewed importance of SBGM- check glucose 1-2 times daily   - Reviewed target glucose goals for patient   fasting and <180 post prandially   - Reviewed importance of following diabetic diet- recommended 135 grams of CHO per day or 45 grams per meal.   - Provided patient education materials    - Interested in personal CGM but I did discuss Medicare will only cover for patients on insulin. Reviewed option for cash pay.     - Strongly recommended starting weekly GLP-1 agonist but he refuses injectable therapy at this time.     -Start Glipizide 5mg PO BID. Reviewed symptoms and management of hypoglycemia. Check sugars 2 times daily and call with readings in 1 week to review.     -Continue Metformin 1000mg once daily. Reviewed side effects and risks vs benefits of medication    -Continue Jardiance 25mg PO daily.  Reviewed side effects and risks vs benefits of medication. Reviewed importance of staying well hydrated on medication.     -Check glucose twice daily.    -Lipids 11/2023- LDL- 66. Reviewed recommendation for statin medication. Restart atorvastatin 40mg daily. Reviewed cardiac protective benefit of statin at length   - no nephropathy   - normal foot exam 11/2023  - UTD with optho- last visit 12/2023  - Reviewed daily foot care and foot exam.     RTC in 6 weeks  7/11/24  FREDRICK England    A total of 30 minutes was spent on obtaining history, reviewing pertinent imaging/labs and specialists notes, evaluating patient, providing multiple treatment options, reinforcing diet/exercise and compliance, and completing documentation.

## 2024-07-11 NOTE — PATIENT INSTRUCTIONS
Continue Metformin 1000 mg daily       Start Glipizide 5mg once daily in the morning with breakfast       Continue Jardiance 25mg once daily

## 2024-08-23 ENCOUNTER — OFFICE VISIT (OUTPATIENT)
Dept: ENDOCRINOLOGY CLINIC | Facility: CLINIC | Age: 74
End: 2024-08-23

## 2024-08-23 VITALS
HEIGHT: 69 IN | SYSTOLIC BLOOD PRESSURE: 111 MMHG | DIASTOLIC BLOOD PRESSURE: 67 MMHG | HEART RATE: 69 BPM | WEIGHT: 169.81 LBS | BODY MASS INDEX: 25.15 KG/M2

## 2024-08-23 DIAGNOSIS — E11.65 UNCONTROLLED TYPE 2 DIABETES MELLITUS WITH HYPERGLYCEMIA (HCC): Primary | ICD-10-CM

## 2024-08-23 LAB
CARTRIDGE EXPIRATION DATE: ABNORMAL DATE
GLUCOSE BLOOD: 153
HEMOGLOBIN A1C: 7.5 % (ref 4.3–5.6)
TEST STRIP EXPIRATION DATE: NORMAL DATE
TEST STRIP LOT #: NORMAL NUMERIC

## 2024-08-23 PROCEDURE — 83036 HEMOGLOBIN GLYCOSYLATED A1C: CPT

## 2024-08-23 PROCEDURE — 99214 OFFICE O/P EST MOD 30 MIN: CPT

## 2024-08-23 PROCEDURE — 82947 ASSAY GLUCOSE BLOOD QUANT: CPT

## 2024-08-23 NOTE — PROGRESS NOTES
Name: Kevin Diehl  Date: 7/11/24    Referring Physician: No ref. provider found    HISTORY OF PRESENT ILLNESS   Kevin Diehl is a 74 year old male who presents for follow up for diabetes mellitus.     Diabetes History:  Diagnosed- in early 20's   Patient has not had hospitalizations for blood sugar issues    Prior glycohemoglobin were: 11.4% 11/2023; 9.3% 2/2024; 7.7% 4/2024; 8.5% 7/2024; 7.5% POC today     Glucose in clinic today - 153 mg/dl     Dietary compliance: Poor- having more regular juice and soda, poor adherence to low CHO diet.      Recall:     Breakfast- oatmeal or eggs with juice or milk   Lunch- sandwich   Dinner- protein with potato, or sausage with spaghetti   Snack- sometimes eats more of dinner meal between dinner and bedtime or sandwich   Beverages- unsweetened tea      Exercise: -Very active during the day   Polyuria/polydipsia: No  Blurred vision: No s/p cataract surgery in one eye- planning for surgery in the other eye.     Episodes of hypoglycemia: Yes  Blood Glucose:    Fasting- 120-150's   Afternoon 's - having some hypoglycemia between breakfast and late lunch- very active in morning hours.     Current DM Regimen:  Metformin 1000 mg PO daily in the evening   Jardiance 25mg daily   Glipizide 5 mg PO daily     Modifying factors:  Medication adherence: Yes  Recent steroids, illness or infections: No      REVIEW OF SYSTEMS  Eyes: Diabetic retinopathy present: No            Most recent visit to eye doctor in last 12 months: Yes- s/p cataract surgery. Was last seen 11/2023    CV: Cardiovascular disease present: No         Hypertension present: No         Hyperlipidemia present: No         Peripheral Vascular Disease present: No    : Nephropathy present: No    Neuro: Neuropathy present: Yes- tingling in feet     Skin: Infection or ulceration: No    Osteoporosis: No    Thyroid disease: No      Medications:     Current Outpatient Medications:     glipiZIDE 5 MG Oral Tab, Take 1 tablet  (5 mg total) by mouth every morning before breakfast., Disp: 30 tablet, Rfl: 3    Empagliflozin (JARDIANCE) 25 MG Oral Tab, Take 25 mg by mouth daily., Disp: 30 tablet, Rfl: 3    Empagliflozin 25 MG Oral Tab, Take by mouth., Disp: , Rfl:     metFORMIN 500 MG Oral Tab, Take 2 tablets (1,000 mg total) by mouth 2 (two) times daily with meals., Disp: 360 tablet, Rfl: 1    Blood Glucose Monitoring Suppl (ONETOUCH VERIO IQ SYSTEM) w/Device Does not apply Kit, 1 Device by Other route daily. Use as directed., Disp: 1 kit, Rfl: 0    Na Sulfate-K Sulfate-Mg Sulf (SUPREP BOWEL PREP KIT) 17.5-3.13-1.6 GM/177ML Oral Solution, Take as directed, Disp: 1 each, Rfl: 0    Glucose Blood (ONETOUCH ULTRA BLUE) In Vitro Strip, Test blood sugar daily Dx E11.9, Disp: 50 strip, Rfl: 5     Allergies:   No Known Allergies    Social History:   Social History     Socioeconomic History    Marital status:    Tobacco Use    Smoking status: Never    Smokeless tobacco: Never   Substance and Sexual Activity    Alcohol use: Not Currently    Drug use: No   Other Topics Concern    Caffeine Concern No       Medical History:   Past Medical History:    Abdominal hernia    Navel Hernia    Bleeding disorder (HCC)    Chickenpox    Colon adenomas    x3    Colon polyps    tubular adenomas    Decorative tattoo    Diabetes (HCC)    Disorder of liver    FATTY LIVER    DVT (deep venous thrombosis) (HCC)    Esophageal reflux    Hearing loss    Herniated intervertebral disc of lumbar spine    Measles    Mumps    Pneumonia    Sinus problem       Surgical history:   Past Surgical History:   Procedure Laterality Date    Colonoscopy N/A 04/20/2018    Procedure: COLONOSCOPY;  Surgeon: Jose Alejandro Boyer MD;  Location: [facility] ENDOSCOPY    Colonoscopy N/A 04/16/2019    Procedure: COLONOSCOPY;  Surgeon: Héctor Lucas MD;  Location: [facility] ENDOSCOPY    Colonoscopy  12/11/2019    Colonoscopy  04/15/2024    Dr. Lucas; colon polyps, hemorrhoids    Colonoscopy N/A  4/15/2024    Procedure: COLONOSCOPY;  Surgeon: Héctor Lucas MD;  Location: [facility] ENDOSCOPY    Hand/finger surgery unlisted Right     hand surgery    Hernia surgery      Umbilical hernia repair         PHYSICAL EXAM  Vitals:    08/23/24 1351   BP: 111/67   Pulse: 69   Weight: 169 lb 12.8 oz (77 kg)   Height: 5' 9\" (1.753 m)         General Appearance:  alert, well developed, in no acute distress  Eyes:  normal conjunctivae, sclera, and normal pupils  Neck: Trachea midline: Normal  Back: no kyphosis or back tenderness  Lymph Nodes:  No abnormal nodes noted  Musculoskeletal:  normal muscle strength and tone  Skin:  normal moisture and skin texture  Hair & Nails:  normal scalp hair     Hematologic:  no excessive bruising  Neuro:  sensory grossly intact and motor grossly intact.  Psychiatric:  oriented to time, self, and place  Nutritional:  no abnormal weight gain or loss      ASSESSMENT/PLAN:    Diabetes mellitus type 2 uncontrolled  -Hga1c- 7.5% POC today - improved  -Congratulated patient on improved glycemic control  -Reviewed ABC's of diabetes    - Reviewed pathogenesis of diabetes.   - Reviewed importance of good glycemic control to prevent microvascular and macrovascular complications including nephropathy, neuropathy, retinopathy, and cardiovascular disease.  - Reviewed importance of SBGM- check glucose 1-2 times daily   - Reviewed target glucose goals for patient   fasting and <180 post prandially   - Reviewed importance of following diabetic diet- recommended 135 grams of CHO per day or 45 grams per meal.   - Provided patient education materials    - Interested in personal CGM but I did discuss Medicare will only cover for patients on insulin. Reviewed option for cash pay.     -Change Glipizide 5 mg with dinner meal. Reviewed symptoms and management of hypoglycemia. Check sugars 2 times daily and call with readings in 1 week to review.     -Continue Metformin 1000mg once daily. Reviewed side effects  and risks vs benefits of medication    -Continue Jardiance 25mg PO daily. Reviewed side effects and risks vs benefits of medication. Reviewed importance of staying well hydrated on medication.     -Check glucose twice daily.    -Lipids 11/2023- LDL- 66. Reviewed recommendation for statin medication. Restart atorvastatin 40mg daily. Reviewed cardiac protective benefit of statin at length   - no nephropathy   - repeat labs at next visit   - normal foot exam 11/2023  - UTD with optho- last visit 12/2023  - Reviewed daily foot care and foot exam.     RTC in 3 months   8/23/24  FREDRICK England    A total of 30 minutes was spent on obtaining history, reviewing pertinent imaging/labs and specialists notes, evaluating patient, providing multiple treatment options, reinforcing diet/exercise and compliance, and completing documentation.

## 2024-10-07 ENCOUNTER — OFFICE VISIT (OUTPATIENT)
Dept: OTOLARYNGOLOGY | Facility: CLINIC | Age: 74
End: 2024-10-07

## 2024-10-07 DIAGNOSIS — H92.01 RIGHT EAR PAIN: ICD-10-CM

## 2024-10-07 DIAGNOSIS — M26.609 TMJ (TEMPOROMANDIBULAR JOINT DISORDER): Primary | ICD-10-CM

## 2024-10-07 PROCEDURE — 99203 OFFICE O/P NEW LOW 30 MIN: CPT | Performed by: STUDENT IN AN ORGANIZED HEALTH CARE EDUCATION/TRAINING PROGRAM

## 2024-10-07 PROCEDURE — 92504 EAR MICROSCOPY EXAMINATION: CPT | Performed by: STUDENT IN AN ORGANIZED HEALTH CARE EDUCATION/TRAINING PROGRAM

## 2024-10-07 NOTE — PROGRESS NOTES
Kevin Diehl is a 74 year old male.   Chief Complaint   Patient presents with    Ear Problem     Bilateral ear ache for multiple months  Pt reports pain in both ears     HPI:   74-year-old presents with intermittent right shocklike pain going into his ear.  Has been going on for several months.  Happens sometimes when he eats.    Current Outpatient Medications   Medication Sig Dispense Refill    glipiZIDE 5 MG Oral Tab Take 1 tablet (5 mg total) by mouth every morning before breakfast. 30 tablet 3    Empagliflozin (JARDIANCE) 25 MG Oral Tab Take 25 mg by mouth daily. 30 tablet 3    Empagliflozin 25 MG Oral Tab Take by mouth.      Blood Glucose Monitoring Suppl (ONETOUCH VERIO IQ SYSTEM) w/Device Does not apply Kit 1 Device by Other route daily. Use as directed. 1 kit 0    Na Sulfate-K Sulfate-Mg Sulf (SUPREP BOWEL PREP KIT) 17.5-3.13-1.6 GM/177ML Oral Solution Take as directed 1 each 0    metFORMIN 500 MG Oral Tab Take 2 tablets (1,000 mg total) by mouth 2 (two) times daily with meals. 360 tablet 1    Glucose Blood (ONETOUCH ULTRA BLUE) In Vitro Strip Test blood sugar daily Dx E11.9 50 strip 5      Past Medical History:    Abdominal hernia    Navel Hernia    Bleeding disorder (HCC)    Chickenpox    Colon adenomas    x3    Colon polyps    tubular adenomas    Decorative tattoo    Diabetes (HCC)    Disorder of liver    FATTY LIVER    DVT (deep venous thrombosis) (HCC)    Esophageal reflux    Hearing loss    Herniated intervertebral disc of lumbar spine    Measles    Mumps    Pneumonia    Sinus problem      Social History:  Social History     Socioeconomic History    Marital status:    Tobacco Use    Smoking status: Never    Smokeless tobacco: Never   Substance and Sexual Activity    Alcohol use: Not Currently    Drug use: No   Other Topics Concern    Caffeine Concern No   Social History Narrative    Kevin is  to spouse Guadalupe x 43 yrs. Father of 2 adult children. He is semi-retired from [employer] home.  He lives at home with his wife and adult son in [place], [place]. Originally from [place].      Past Surgical History:   Procedure Laterality Date    Colonoscopy N/A 04/20/2018    Procedure: COLONOSCOPY;  Surgeon: Jose Alejandro Boyer MD;  Location: [facility] ENDOSCOPY    Colonoscopy N/A 04/16/2019    Procedure: COLONOSCOPY;  Surgeon: Héctor Lucas MD;  Location: [facility] ENDOSCOPY    Colonoscopy  12/11/2019    Colonoscopy  04/15/2024    Dr. Lucas; colon polyps, hemorrhoids    Colonoscopy N/A 4/15/2024    Procedure: COLONOSCOPY;  Surgeon: Héctor Lucas MD;  Location: [facility] ENDOSCOPY    Hand/finger surgery unlisted Right     hand surgery    Hernia surgery      Umbilical hernia repair           EXAM:   There were no vitals taken for this visit.    System Details   Skin Inspection - Normal.   Constitutional Overall appearance - Normal.   Head/Face Symmetric, TMJ tenderness not present    Eyes EOMI, PERRL   Right ear:  Canal clear, TM intact, no BLU   Left ear:  Canal clear, TM intact, no BLU   Nose: Septum midline, inferior turbinates not enlarged, nasal valves without collapse    Oral cavity/Oropharynx: No lesions or masses on inspection or palpation, tonsils symmetric    Neck: Soft without LAD, thyroid not enlarged  Voice clear/ no stridor   Other:      SCOPES AND PROCEDURES:         AUDIOGRAM AND IMAGING:         IMPRESSION:   1. TMJ (temporomandibular joint disorder)    2. Right ear pain       Recommendations:  -Normal ear exam.  Suspect a TMD or musculoskeletal process such as arthritis of his jaw in the setting of poor dentition leading to this intermittent ear pain  -Discussed conservative measures for TMD in detail he is going to try NSAIDs, heat and massage when this happens  -If the pain worsens or persist despite these measures should return    The patient indicates understanding of these issues and agrees to the plan.      Jose Eduardo Tabor MD  10/7/2024  3:05 PM

## 2024-11-20 NOTE — TELEPHONE ENCOUNTER
Endocrine Refill protocol for oral and injectable diabetic medications    Protocol Criteria:  PASSED  Reason: No Visit in required time frame and N/A    If all below requirements are met, send a 90-day supply with 1 refill per provider protocol.    Verify appointment with Endocrinology completed in the last 6 months or scheduled in the next 3 months.  Verify A1C has been completed within the last 6 months and is below 8.5%     Last completed office visit: 8/23/2024 Lauryn Lamb APRN   Next scheduled Follow up:   Future Appointments   Date Time Provider Department Center   11/27/2024  1:45 PM Lauryn Lamb APRN [facility]      Last A1c result: Last A1c value was 7.5% done 8/23/2024.

## 2024-11-21 RX ORDER — GLIPIZIDE 5 MG/1
5 TABLET ORAL
Qty: 30 TABLET | Refills: 0 | Status: SHIPPED | OUTPATIENT
Start: 2024-11-21

## 2024-12-05 ENCOUNTER — TELEPHONE (OUTPATIENT)
Dept: INTERNAL MEDICINE CLINIC | Facility: CLINIC | Age: 74
End: 2024-12-05

## 2024-12-05 NOTE — TELEPHONE ENCOUNTER
Left voicemail. Patient has not been seen for more than a year and calling to see if he'd like to schedule appt with Dr. molina

## 2024-12-10 ENCOUNTER — TELEPHONE (OUTPATIENT)
Dept: INTERNAL MEDICINE CLINIC | Facility: CLINIC | Age: 74
End: 2024-12-10

## 2024-12-10 DIAGNOSIS — E11.49 TYPE II DIABETES MELLITUS WITH NEUROLOGICAL MANIFESTATIONS (HCC): Primary | ICD-10-CM

## 2024-12-10 NOTE — TELEPHONE ENCOUNTER
Left message to come in for an appointment has not been seen in more than 1 year orders put in for testing for urine microalbumin and kidney function

## 2024-12-26 RX ORDER — GLIPIZIDE 5 MG/1
5 TABLET ORAL
Qty: 30 TABLET | Refills: 0 | Status: SHIPPED | OUTPATIENT
Start: 2024-12-26 | End: 2024-12-30

## 2024-12-26 NOTE — TELEPHONE ENCOUNTER
Endocrine Refill protocol for oral and injectable diabetic medications    Protocol Criteria:  PASSED      If all below requirements are met, send a 90-day supply with 1 refill per provider protocol.    Verify appointment with Endocrinology completed in the last 6 months or scheduled in the next 3 months.  Verify A1C has been completed within the last 6 months and is below 8.5%     Last completed office visit: 8/23/2024 Lauryn Lamb APRN     Next scheduled Follow up: No future appointments. - Called patient, no answer, left detailed message to call back. [facility] [employer] message sent     Last A1c result: Last A1c value was 7.5% done 8/23/2024.

## 2024-12-30 RX ORDER — GLIPIZIDE 5 MG/1
5 TABLET ORAL
Qty: 30 TABLET | Refills: 0 | Status: SHIPPED | OUTPATIENT
Start: 2024-12-30

## 2025-01-01 NOTE — PATIENT INSTRUCTIONS
Age-related nuclear cataract of both eyes  No treatment is required. Will continue to observe. Hyperopia with astigmatism and presbyopia, bilateral  Patient does not want an RX .  He is content with his +3.25 for reading and will use +2.00 for TV etc.
normal/ROM intact/normal gait/strength 5/5 bilateral upper extremities/strength 5/5 bilateral lower extremities

## 2025-01-27 RX ORDER — GLIPIZIDE 5 MG/1
5 TABLET ORAL
Qty: 90 TABLET | Refills: 1 | Status: SHIPPED | OUTPATIENT
Start: 2025-01-27

## 2025-01-27 NOTE — TELEPHONE ENCOUNTER
Endocrine Refill protocol for oral and injectable diabetic medications    Protocol Criteria:  PASSED  Reason: N/A    If all below requirements are met, send a 90-day supply with 1 refill per provider protocol.    Verify appointment with Endocrinology completed in the last 6 months or scheduled in the next 3 months.  Verify A1C has been completed within the last 6 months and is below 8.5%     Last completed office visit: 8/23/2024 Lauryn Lamb APRN   Next scheduled Follow up: No future appointments.   Last A1c result: Last A1c value was 7.5% done 8/23/2024.    90 day +1 refill pending

## 2025-04-02 ENCOUNTER — OFFICE VISIT (OUTPATIENT)
Dept: PODIATRY CLINIC | Facility: CLINIC | Age: 75
End: 2025-04-02
Payer: MEDICARE

## 2025-04-02 DIAGNOSIS — B35.1 ONYCHOMYCOSIS OF MULTIPLE TOENAILS WITH TYPE 2 DIABETES MELLITUS (HCC): Primary | ICD-10-CM

## 2025-04-02 DIAGNOSIS — E11.69 ONYCHOMYCOSIS OF MULTIPLE TOENAILS WITH TYPE 2 DIABETES MELLITUS (HCC): Primary | ICD-10-CM

## 2025-04-02 DIAGNOSIS — I73.9 PVD (PERIPHERAL VASCULAR DISEASE): ICD-10-CM

## 2025-04-02 PROCEDURE — 11721 DEBRIDE NAIL 6 OR MORE: CPT | Performed by: PODIATRIST

## 2025-04-02 PROCEDURE — 99204 OFFICE O/P NEW MOD 45 MIN: CPT | Performed by: PODIATRIST

## 2025-04-02 NOTE — PROGRESS NOTES
Reason for Visit      Kevin Diehl is a 74 year old male presents today complaining of bilateral diabetic foot evaluation.     History of Present Illness     Patient presents to clinic today for routine diabetic foot evaluation.  Patient has seen podiatrist in the past for ingrown toenails.  Patient presents to clinic today complaining elongated thickened nails he is unable to debride himself.  Patient denies any open lesions at this time.    The following portions of the patient's history were reviewed and updated as appropriate: allergies, current medications, past family history, past medical history, past social history, past surgical history and problem list.    Allergies[1]      Current Outpatient Medications:     GLIPIZIDE 5 MG Oral Tab, Take 1 tablet by mouth every morning before breakfast., Disp: 90 tablet, Rfl: 1    Empagliflozin (JARDIANCE) 25 MG Oral Tab, Take 25 mg by mouth daily., Disp: 30 tablet, Rfl: 3    Empagliflozin 25 MG Oral Tab, Take by mouth., Disp: , Rfl:     Na Sulfate-K Sulfate-Mg Sulf (SUPREP BOWEL PREP KIT) 17.5-3.13-1.6 GM/177ML Oral Solution, Take as directed, Disp: 1 each, Rfl: 0    metFORMIN 500 MG Oral Tab, Take 2 tablets (1,000 mg total) by mouth 2 (two) times daily with meals., Disp: 360 tablet, Rfl: 1    Glucose Blood (ONETOUCH ULTRA BLUE) In Vitro Strip, Test blood sugar daily Dx E11.9, Disp: 50 strip, Rfl: 5    There are no discontinued medications.    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)    Age-related nuclear cataract of both eyes    Other cirrhosis of liver (HCC)    Chronic deep vein thrombosis (DVT) of femoral vein of left lower extremity (HCC)    Protein C deficiency (HCC)    Antiphospholipid antibody with hypercoagulable state (HCC)    Atherosclerosis of  abdominal aorta    Adenomatous polyp of colon    Encounter for colonoscopy due to history of adenomatous colonic polyps       Past Medical History:    Abdominal hernia    Navel Hernia    Bleeding disorder    Chickenpox    Colon adenomas    x3    Colon polyps    tubular adenomas    Decorative tattoo    Diabetes (HCC)    Disorder of liver    FATTY LIVER    DVT (deep venous thrombosis) (HCC)    Esophageal reflux    Hearing loss    Herniated intervertebral disc of lumbar spine    Measles    Mumps    Pneumonia    Sinus problem       Past Surgical History:   Procedure Laterality Date    Colonoscopy N/A 2018    Procedure: COLONOSCOPY;  Surgeon: Jose Alejandro Boyer MD;  Location: [facility] ENDOSCOPY    Colonoscopy N/A 2019    Procedure: COLONOSCOPY;  Surgeon: Héctor Lucas MD;  Location: [facility] ENDOSCOPY    Colonoscopy  2019    Colonoscopy  04/15/2024    Dr. Lucas; colon polyps, hemorrhoids    Colonoscopy N/A 4/15/2024    Procedure: COLONOSCOPY;  Surgeon: Héctor Lucas MD;  Location: [facility] ENDOSCOPY    Hand/finger surgery unlisted Right     hand surgery    Hernia surgery      Umbilical hernia repair         Family History   Problem Relation Age of Onset    Diabetes Father     Cancer Mother         colon cancer,  87    Clotting Disorder Mother         post surgery development of DVT    Diabetes Other         aunt    Diabetes Sister     Diabetes Other         uncle    Cancer Maternal Grandmother         unknown etiology- gender specific    Clotting Disorder Maternal Grandfather          of DVT in legs    Glaucoma Neg     Bleeding Disorders Neg     Anemia Neg        Social History     Occupational History    Not on file   Tobacco Use    Smoking status: Never    Smokeless tobacco: Never   Substance and Sexual Activity    Alcohol use: Not Currently    Drug use: No    Sexual activity: Not on file       ROS      Constitutional: negative for chills, fevers and sweats  Gastrointestinal: negative for  abdominal pain, diarrhea, nausea and vomiting  Genitourinary:negative for dysuria and hematuria  Musculoskeletal:negative for arthralgias and muscle weakness  Neurological: negative for paresthesia and weakness  All others reviewed and negative.      Physical Exam     LE PHYSICAL EXAM    Constitution: Well-developed and well-nourished. Gait appears normal. No apparent distress. Alert and oriented to person, place, and time.  Integument: There are no varicosities. Skin appears moist, warm, and supple with positive hair growth. There are no color changes. No open lesions. No macerations, No Hyperkeratotic lesions.  Vascular examination: Dorsalis pedis and posterior tibial pulses are strong bilaterally with capillary filling time less than 3 seconds to all digits. There is no peripheral edema..  Neurological Sensorium: Grossly intact to sharp/dull. Vibratory: Intact.  Musculoskeletal:   5/5 pedal muscle strength b/l       Advanced trophic changes as: hair growth (decrease or absence) nail changes  (thickening) pigmentary changes (discoloration) skin texture (thin, shiny)   Procedure       Nails 1 through 5 bilateral debrided with use of a nail nipper.  Patient Toller procedure well had no complications.  Neurovascular status intact to the level of the digits post procedure.     Assessment and Plan     Encounter Diagnoses   Name Primary?    Onychomycosis of multiple toenails with type 2 diabetes mellitus (HCC) Yes    PVD (peripheral vascular disease)      Diabetic education and instructions have been provided. We reviewed and discussed the following:    -risk categories related to pts with diabetes and foot or lower extremity complications per ADA.    -adherence to medication regimen and close monitoring or blood sugar control.   -daily monitoring/inspection of feet and shoes.   -proper management of diet and weight   -regular follow up with PCP and specialty providers as recommended   -Lower extremity complications  related to DM were reviewed and stressed prevention.           Evaluated the patient. Discussed treatment options with the patient.  Discussed with patient proper care and hygiene for their feet.  Patient tolerated procedures well, without incident.    Instrumentation used includes nail nippers and electric  where appropriate.  Procedure: (14027 Debridement of toenails 6-10) Surgically debrided and mechanically reduced 6 or more toenails.Hemmorhage occurred none.Nails that were debrided appeared dystrophic and caused the patient pain in shoe gear.Nails 5 Left & 5 Right.     Evaluated patient. Discussed treatment options with patient.  Discussed proper hygiene and care for feet as well as use of emollient creams (ie Urea based creams)  Answered all patient questions. Discussed offloading hyperkeratotic lesions with proper shoe gear, offloading pads, and insoles.     Patient was instructed to call the office or on-call podiatric physician immediately with any issues or concerns before the next scheduled visit. Patient to follow-up in clinic in 3 months      Dayana Rubi DPM, GAVIN.MICHELE GLORIA  Diplomat, American Board of Foot and Ankle Surgery  Certified in Foot and Rearfoot/Ankle Reconstruction  Fellow of the American College of Foot and Ankle Surgeons  Fellowship Trained Foot and Ankle Surgeon   [facility]     4/2/2025    1:06 PM         [1] No Known Allergies

## 2025-07-25 RX ORDER — GLIPIZIDE 5 MG/1
5 TABLET ORAL
Qty: 90 TABLET | Refills: 0 | Status: SHIPPED | OUTPATIENT
Start: 2025-07-25

## 2025-07-25 NOTE — TELEPHONE ENCOUNTER
Endocrine Refill protocol for oral and injectable diabetic medications    Protocol Criteria:  FAILED  Reason: No Visit in required time frame mcm sent    If all below requirements are met, send a 90-day supply with 1 refill per provider protocol.    Verify appointment with Endocrinology completed in the last 6 months or scheduled in the next 3 months.  Verify A1C has been completed within the last 6 months and is below 8.5%     Last completed office visit: 8/23/2024 Lauryn Lamb APRN   Next scheduled Follow up: No future appointments. [facility] sent  Last A1c result: Last A1c value was 7.5% done 8/23/2024.

## (undated) DEVICE — GIJAW SINGLE-USE BIOPSY FORCEPS WITH NEEDLE: Brand: GIJAW

## (undated) DEVICE — REM POLYHESIVE ADULT PATIENT RETURN ELECTRODE: Brand: VALLEYLAB

## (undated) DEVICE — FORCEP RADIAL JAW 4

## (undated) DEVICE — SPECIMEN TRAP LUKI

## (undated) DEVICE — ENDOSCOPY PACK UPPER: Brand: MEDLINE INDUSTRIES, INC.

## (undated) DEVICE — CAP E SEAL 15X4MM YEL DST ATTCH

## (undated) DEVICE — DISPOSABLE DISTAL ATTACHMENT: Brand: DISPOSABLE DISTAL ATTACHMENT

## (undated) DEVICE — KIT ENDO ORCAPOD 160/180/190

## (undated) DEVICE — KIT CLEAN ENDOKIT 1.1OZ GOWNX2

## (undated) DEVICE — SNARE CAPTI HEX STIFF MEDIUM

## (undated) DEVICE — TRAP POLYP W/ 2 SPEC TY CLR MAGNIFYING WIND

## (undated) DEVICE — LINE MNTR ADLT SET O2 INTMD

## (undated) DEVICE — ENDOSCOPY PACK - LOWER: Brand: MEDLINE INDUSTRIES, INC.

## (undated) DEVICE — Device: Brand: DEFENDO AIR/WATER/SUCTION AND BIOPSY VALVE

## (undated) DEVICE — NEEDLE CONTRAST INTERJECT 25G

## (undated) DEVICE — Device: Brand: DUAL NARE NASAL CANNULAE FEMALE LUER CON 7FT O2 TUBE

## (undated) DEVICE — 35 ML SYRINGE REGULAR TIP: Brand: MONOJECT

## (undated) DEVICE — LASSO POLYPECTOMY SNARE: Brand: LASSO

## (undated) DEVICE — SNARE CAPTIFLEX MICRO-OVL OLY

## (undated) DEVICE — MEDI-VAC NON-CONDUCTIVE SUCTION TUBING 6MM X 1.8M (6FT.) L: Brand: CARDINAL HEALTH

## (undated) DEVICE — HEXAGONAL CAPTIVATOR STIFF 13M

## (undated) DEVICE — CLIP LGT 11MM OPEN 2.8MM 235CM

## (undated) DEVICE — 60 ML SYRINGE REGULAR TIP: Brand: MONOJECT

## (undated) DEVICE — RESOLUTION 360 CLIP

## (undated) DEVICE — SNARE OPTMZ PLPCTM TRP

## (undated) DEVICE — Device: Brand: CUSTOM PROCEDURE KIT

## (undated) NOTE — MR AVS SNAPSHOT
[facility] at [address] [place] [address] Road [address] [phone]               Thank you for choosing us for your health care visit with Evi Min MD.  We are glad to serve you and happy to Test by Intradermal route  every day           * Glucose Blood Strp   Test blood sugar daily Dx E11.9   Commonly known as:  ONETOUCH ULTRA BLUE           PREVNAR 13 Susp   Generic drug:  pneumococcal 13-Teresa Conj Vacc           Rivaroxaban 20 MG Tabs   Take Lifestyle Modification Recommendations:    Modification Recommendation   Weight Reduction Maintain normal body weight (body mass index 18.5-24.9 kg/m2)   DASH eating plan Adopt a diet rich in fruits, vegetables, and low fat dairy products with reduced cont

## (undated) NOTE — MR AVS SNAPSHOT
[facility] at [address] [place] [address] Road [address] [phone]               Thank you for choosing us for your health care visit with Aida Hernandez MD.  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 If you do not sign up before the expiration date, you must request a new code.     Your unique [employer] Access Code: VV

## (undated) NOTE — LETTER
[facility]  Administration of Anesthesia  1. Chetan Morrow, or _________________________________ acting on his behalf, (Patient) (Dependent/Representative) request to receive anesthesia for my pending procedure/operation/treatment.   A infections, high spinal block, spinal bleeding, seizure, cardiac arrest and death. 7. AWARENESS: I understand that it is possible (but unlikely) to have explicit memory of events from the operating room while under general anesthesia.   8. ELECTROCONVULSIV unconscious pt /Relationship    My signature below affirms that prior to the time of the procedure, I have explained to the patient and/or his/her guardian, the risks and benefits of undergoing anesthesia, as well as any reasonable alternatives.     _______

## (undated) NOTE — LETTER
[facility], [place]  Authorization for Invasive Procedures  1.  I hereby authorize Dr. Mala Rivas, my physician and whomever may be designated as the doctor's assistant, to perform the following operation and/or procedure:  Annie Montiel performed for the purposes of advancing medicine, science, and/or education, provided my identity is not revealed. If the procedure has been videotaped, the physician/surgeon will obtain the original videotape.  The hospital will not be responsible for stor My signature below affirms that prior to the time of the procedure, I have explained to the patient and/or his legal representative, the risks and benefits involved in the proposed treatment and any reasonable alternative to the proposed treatment.  I have

## (undated) NOTE — LETTER
8/1/2019    [address]        [place] [address]            Dear Gaudencio Snyder,      Our records indicate that you are due for an appointment for a Colonoscopy in October 2019, or shortly there after, with Zelda Hill,

## (undated) NOTE — LETTER
Date & Time: 4/26/2018, 3:27 PM  Patient: Rena Adams  Encounter Provider(s):    Robby Mar MD         This certifies that I, Renajodee Adams, a patient at an [facility], am leaving the facility voluntarily and a

## (undated) NOTE — ED AVS SNAPSHOT
[facility] Emergency Department    [place] [address]    Phone:  [phone]    Fax:  [phone]           Gregg Singh   MRN: [id number]    Department:  [facility] Emergency Department   Date of Visit:  4/17/ and Class Registration line at [phone] or find a doctor online by visiting [email or web address].    IF THERE IS ANY CHANGE OR WORSENING OF YOUR CONDITION, CALL YOUR PRIMARY CARE PHYSICIAN AT ONCE OR RETURN IMMEDIATELY TO [address] [place] [address].     If

## (undated) NOTE — LETTER
[facility]  Administration of Anesthesia  I, Kevin Diehl agree to be cared for by a physician anesthesiologist alone and/or with a nurse anesthetist, who is specially trained to monitor me and give me medicine to put me to sleep or keep me comfortable during my procedure    I understand that my anesthesiologist and/or anesthetist is not an employee or agent of [facility] or [employer]. He or she works for [facility]    As the patient asking for anesthesia services, I agree to:  Allow the anesthesiologist (anesthesia doctor) to give me medicine and do additional procedures as necessary. Some examples are: Starting or using an “IV” to give me medicine, fluids or blood during my procedure, and having a breathing tube placed to help me breathe when I’m asleep (intubation). In the event that my heart stops working properly, I understand that my anesthesiologist will make every effort to sustain my life, unless otherwise directed by [facility] Do Not Resuscitate documents.  Tell my anesthesia doctor before my procedure:  If I am pregnant.  The last time that I ate or drank.  iii. All of the medicines I take (including prescriptions, herbal supplements, and pills I can buy without a prescription (including street drugs/illegal medications). Failure to inform my anesthesiologist about these medicines may increase my risk of anesthetic complications.  iv.If I am allergic to anything or have had a reaction to anesthesia before.  I understand how the anesthesia medicine will help me (benefits).  I understand that with any type of anesthesia medicine there are risks:  The most common risks are: nausea, vomiting, sore throat, muscle soreness, damage to my eyes, mouth, or teeth (from breathing tube placement).  Rare risks include: remembering what happened during my procedure, allergic reactions to medications, injury to my airway, heart, lungs, vision, nerves, or  muscles and in extremely rare instances death.  My doctor has explained to me other choices available to me for my care (alternatives).  Pregnant Patients (“epidural”):  I understand that the risks of having an epidural (medicine given into my back to help control pain during labor), include itching, low blood pressure, difficulty urinating, headache or slowing of the baby’s heart. Very rare risks include infection, bleeding, seizure, irregular heart rhythms and nerve injury.  Regional Anesthesia (“spinal”, “epidural”, & “nerve blocks”):  I understand that rare but potential complications include headache, bleeding, infection, seizure, irregular heart rhythms, and nerve injury.    _____________________________________________________________________________  Patient (or Representative) Signature/Relationship to Patient  Date   Time    _____________________________________________________________________________   Name (if used)    Language/Organization   Time    _____________________________________________________________________________  Nurse Anesthetist Signature     Date   Time  _____________________________________________________________________________  Anesthesiologist Signature     Date   Time  I have discussed the procedure and information above with the patient (or patient’s representative) and answered their questions. The patient or their representative has agreed to have anesthesia services.    _____________________________________________________________________________  Witness        Date   Time  I have verified that the signature is that of the patient or patient’s representative, and that it was signed before the procedure  Patient Name: Kevin Diehl     : 1950                 Printed: 2024 at 2:51 PM    Medical Record #: [id number]                                            Page 1 of 1  ----------ANESTHESIA CONSENT----------

## (undated) NOTE — LETTER
[facility]  [address], [place], [place]  Authorization for Surgical Operation and Procedure                                                                                           I hereby authorize Héctor Lucas MD, my physician and his/her assistants (if applicable), which may include medical students, residents, and/or fellows, to perform the following surgical operation/ procedure and administer such anesthesia as may be determined necessary by my physician: Operation/Procedure name (s) COLONOSCOPY on Kevin Diehl   2.   I recognize that during the surgical operation/procedure, unforeseen conditions may necessitate additional or different procedures than those listed above.  I, therefore, further authorize and request that the above-named surgeon, assistants, or designees perform such procedures as are, in their judgment, necessary and desirable.    3.   My surgeon/physician has discussed prior to my surgery the potential benefits, risks and side effects of this procedure; the likelihood of achieving goals; and potential problems that might occur during recuperation.  They also discussed reasonable alternatives to the procedure, including risks, benefits, and side effects related to the alternatives and risks related to not receiving this procedure.  I have had all my questions answered and I acknowledge that no guarantee has been made as to the result that may be obtained.    4.   Should the need arise during my operation/procedure, which includes change of level of care prior to discharge, I also consent to the administration of blood and/or blood products.  Further, I understand that despite careful testing and screening of blood or blood products by collecting agencies, I may still be subject to ill effects as a result of receiving a blood transfusion and/or blood products.  The following are some, but not all, of the potential risks that can occur: fever and allergic  reactions, hemolytic reactions, transmission of diseases such as Hepatitis, AIDS and Cytomegalovirus (CMV) and fluid overload.  In the event that I wish to have an autologous transfusion of my own blood, or a directed donor transfusion, I will discuss this with my physician.  Check only if Refusing Blood or Blood Products  I understand refusal of blood or blood products as deemed necessary by my physician may have serious consequences to my condition to include possible death. I hereby assume responsibility for my refusal and release the hospital, its personnel, and my physicians from any responsibility for the consequences of my refusal.    o  Refuse   5.   I authorize the use of any specimen, organs, tissues, body parts or foreign objects that may be removed from my body during the operation/procedure for diagnosis, research or teaching purposes and their subsequent disposal by hospital authorities.  I also authorize the release of specimen test results and/or written reports to my treating physician on the hospital medical staff or other referring or consulting physicians involved in my care, at the discretion of the Pathologist or my treating physician.    6.   I consent to the photographing or videotaping of the operations or procedures to be performed, including appropriate portions of my body for medical, scientific, or educational purposes, provided my identity is not revealed by the pictures or by descriptive texts accompanying them.  If the procedure has been photographed/videotaped, the surgeon will obtain the original picture, image, videotape or CD.  The hospital will not be responsible for storage, release or maintenance of the picture, image, tape or CD.    7.   I consent to the presence of a  or observers in the operating room as deemed necessary by my physician or their designees.    8.   I recognize that in the event my procedure results in extended X-Ray/fluoroscopy time, I may  develop a skin reaction.    9. If I have a Do Not Attempt Resuscitation (DNAR) order in place, that status will be suspended while in the operating room, procedural suite, and during the recovery period unless otherwise explicitly stated by me (or a person authorized to consent on my behalf). The surgeon or my attending physician will determine when the applicable recovery period ends for purposes of reinstating the DNAR order.  10. Patients having a sterilization procedure: I understand that if the procedure is successful the results will be permanent and it will therefore be impossible for me to inseminate, conceive, or bear children.  I also understand that the procedure is intended to result in sterility, although the result has not been guaranteed.   11. I acknowledge that my physician has explained sedation/analgesia administration to me including the risk and benefits I consent to the administration of sedation/analgesia as may be necessary or desirable in the judgment of my physician.    I CERTIFY THAT I HAVE READ AND FULLY UNDERSTAND THE ABOVE CONSENT TO OPERATION and/or OTHER PROCEDURE.     _________________________________________ _________________________________     ___________________________________  Signature of Patient     Signature of Responsible Person                   Printed Name of Responsible Person                              _________________________________________ ______________________________        ___________________________________  Signature of Witness         Date  Time         Relationship to Patient    STATEMENT OF PHYSICIAN My signature below affirms that prior to the time of the procedure; I have explained to the patient and/or his/her legal representative, the risks and benefits involved in the proposed treatment and any reasonable alternative to the proposed treatment. I have also explained the risks and benefits involved in refusal of the proposed treatment and alternatives  to the proposed treatment and have answered the patient's questions. If I have a significant financial interest in a co-management agreement or a significant financial interest in any product or implant, or other significant relationship used in this procedure/surgery, I have disclosed this and had a discussion with my patient.     _______________________________________________________________ _____________________________  (Signature of Physician)                                                                                         (Date)                                   (Time)  Patient Name: Kevin PATTERSON Fazal    : 1950   Printed: 2024      Medical Record #: [id number]                                              Page 1 of 1

## (undated) NOTE — LETTER
4/1/2020    [address]        [place] [address]            Dear Kaia Miller,      Our records indicate that you are due for an appointment for a Colonoscopy in June 2020, or shortly there after, with Saad Casillas MD.

## (undated) NOTE — LETTER
[facility], [place]  Authorization for Invasive Procedures  1.  I hereby authorize Dr. Leandra Stearns , my physician and whomever may be designated as the doctor's assistant, to perform the following operation and/or procedure:  Tiff Morales performed for the purposes of advancing medicine, science, and/or education, provided my identity is not revealed. If the procedure has been videotaped, the physician/surgeon will obtain the original videotape.  The hospital will not be responsible for stor My signature below affirms that prior to the time of the procedure, I have explained to the patient and/or his legal representative, the risks and benefits involved in the proposed treatment and any reasonable alternative to the proposed treatment.  I have

## (undated) NOTE — LETTER
10/25/2023       Fernando Callaway   [address]      Dear Bere Garcia,    IF YOU ARE 48YEARS OF AGE OR OLDER, COLORECTAL CANCER SCREENING CAN SAVE YOUR LIFE. As your primary care doctor, I want to do everything I can to protect your health. Colorectal cancer is the second leading cause of cancer-related deaths in the [place]. Polyps and colorectal cancer do not always cause symptoms. That's why screening is so important. Regular screening can find colorectal cancer early when it is most curable. These tests can help prevent the development of colorectal cancer. All adults 48 and older should have one of the following colon cancer screenings as recommended by the [address]:    Colonoscopy every ten years or as advised by your physician  iFOBT every year (The iFOBT is a home test to detect blood in the stool. The test is quick, easy and requires no dietary restrictions.)    Please contact my office today so together we can determine which colorectal cancer screening is best for you. Or, if you have already had one of the above colon cancer screenings, please let me know the date, method of screening, physician and/or facility that performed the screening so I can update your medical record. If you have a history of colon cancer, colon polyps, or an abnormal colon screening result, please follow the instructions you have previously received from myself or your specialists. There are no excuses to ignore early detection! This is one cancer you can prevent. Regular exams and preventive screenings are among the most important things you can do. Thank you for taking time to take care of yourself.         Dr. Harry Leiva MD   [phone]

## (undated) NOTE — MR AVS SNAPSHOT
[facility] at [address] [place] [address] Road [address] [phone]               Thank you for choosing us for your health care visit with Merle Grant MD.  We are glad to serve you and happy to Test by Intradermal route  every day           * Glucose Blood Strp   Test blood sugar daily Dx E11.9   Commonly known as:  ONETOUCH ULTRA BLUE           PREVNAR 13 Susp   Generic drug:  pneumococcal 13-Teresa Conj Vacc           rivaroxaban 15 MG Tabs   Take Dietary sodium reduction Reduce dietary sodium intake to <= 100 mmol per day (2.4 g sodium or 6 g sodium chloride)   Aerobic physical activity Regular aerobic physical activity (e.g., brisk walking, light jogging, cycling, swimming, etc.) for a goal of at

## (undated) NOTE — ED AVS SNAPSHOT
[facility] Emergency Department    [address]     [address] [place] [address]    Phone:  [phone]    Fax:  [phone]           Diamond Maynard   MRN: [id number]    Department:  [facility] Emergency Department   Date of Visit:  4/17/ If you have difficulty scheduling your follow-up appointment as directed, please call our  at [phone]. Si tiene problemas para programar tamika trista de seguimiento según lo indicado, llame al encargado de jesi al [phone].     It i continue to take your medications as instructed by your Primary Care doctor until you can check with your doctor. Please bring the medication list to your next doctor's appointment.     Any imaging studies and labs completed today can be reviewed in your [place] plans. To get signed up and covered, please call [phone] and ask to get set up for an insurance coverage that is in-network with [facility]. Mimi     Sign up for siXist, your secure online medical record.   [employer] wi